# Patient Record
Sex: FEMALE | Race: OTHER | NOT HISPANIC OR LATINO
[De-identification: names, ages, dates, MRNs, and addresses within clinical notes are randomized per-mention and may not be internally consistent; named-entity substitution may affect disease eponyms.]

---

## 2019-08-07 PROBLEM — Z00.00 ENCOUNTER FOR PREVENTIVE HEALTH EXAMINATION: Status: ACTIVE | Noted: 2019-08-07

## 2019-08-08 ENCOUNTER — APPOINTMENT (OUTPATIENT)
Dept: SURGERY | Facility: CLINIC | Age: 44
End: 2019-08-08
Payer: COMMERCIAL

## 2019-08-08 VITALS
OXYGEN SATURATION: 99 % | TEMPERATURE: 98.9 F | BODY MASS INDEX: 21.6 KG/M2 | WEIGHT: 110 LBS | DIASTOLIC BLOOD PRESSURE: 85 MMHG | HEART RATE: 71 BPM | SYSTOLIC BLOOD PRESSURE: 129 MMHG | HEIGHT: 60 IN

## 2019-08-08 PROCEDURE — 99204 OFFICE O/P NEW MOD 45 MIN: CPT

## 2019-08-15 ENCOUNTER — RESULT REVIEW (OUTPATIENT)
Age: 44
End: 2019-08-15

## 2019-08-19 ENCOUNTER — APPOINTMENT (OUTPATIENT)
Dept: SURGERY | Facility: CLINIC | Age: 44
End: 2019-08-19
Payer: COMMERCIAL

## 2019-08-19 VITALS
BODY MASS INDEX: 21.6 KG/M2 | RESPIRATION RATE: 99 BRPM | HEART RATE: 98 BPM | SYSTOLIC BLOOD PRESSURE: 120 MMHG | TEMPERATURE: 98.1 F | DIASTOLIC BLOOD PRESSURE: 83 MMHG | HEIGHT: 60 IN | WEIGHT: 110 LBS

## 2019-08-19 PROCEDURE — 99213 OFFICE O/P EST LOW 20 MIN: CPT

## 2019-09-09 ENCOUNTER — APPOINTMENT (OUTPATIENT)
Dept: SURGERY | Facility: CLINIC | Age: 44
End: 2019-09-09
Payer: COMMERCIAL

## 2019-09-09 VITALS
SYSTOLIC BLOOD PRESSURE: 138 MMHG | HEIGHT: 60 IN | BODY MASS INDEX: 21.6 KG/M2 | HEART RATE: 103 BPM | WEIGHT: 110 LBS | OXYGEN SATURATION: 100 % | DIASTOLIC BLOOD PRESSURE: 85 MMHG

## 2019-09-09 DIAGNOSIS — Z78.9 OTHER SPECIFIED HEALTH STATUS: ICD-10-CM

## 2019-09-09 DIAGNOSIS — Z82.49 FAMILY HISTORY OF ISCHEMIC HEART DISEASE AND OTHER DISEASES OF THE CIRCULATORY SYSTEM: ICD-10-CM

## 2019-09-09 DIAGNOSIS — Z80.3 FAMILY HISTORY OF MALIGNANT NEOPLASM OF BREAST: ICD-10-CM

## 2019-09-09 PROCEDURE — 99213 OFFICE O/P EST LOW 20 MIN: CPT

## 2019-09-09 NOTE — HISTORY OF PRESENT ILLNESS
[de-identified] : This is a 44 years old patient s/p Sonoguided left breast biopsy on 08/15/2019. pathology results are consistent with Invasive poorly differentiated ductal carcinoma. ER/DE positive HER2 negative.   Patient is s/p BL breast MRI on 08/23/2019 non mass enhancement  that showed  in the right breast. MRI guided breast biopsy was recommended.

## 2019-09-09 NOTE — PLAN
[FreeTextEntry1] : right breast MRI guided Biopsy \par return after the test\par obtain results of genetic testing

## 2019-09-09 NOTE — DATA REVIEWED
[FreeTextEntry1] :  Vivienne Accession Number : [id number]\par \par ALBERTO STANTON                   1\par \par \par \par Surgical Final Report\par \par \par \par \par Final Diagnosis\par Breast, left, 3 o?clock, ultrasound guided core biopsy\par - Invasive poorly differentiated ductal carcinoma\par - Ines score 8/9 (3 + 3 + 2) in this limited material\par - Invasive tumor measures at least 1.4 cm\par - Ductal carcinoma in situ, solid pattern, high-nuclear\par grade\par - Microcalcifications are not seen\par - Lymphovascular permeation by tumor is not seen\par - ER/AR/HER-2 study in progress; an addendum will follow\par \par Case reported to Tumor Registry.\par \par Dr. Burnette was notified of the diagnosis by email on 8/19/2019.\par \par Verified by: Nomi Roche DO\par (Electronic Signature)\par Reported on: 08/19/19 10:45 EDT, [address] [place] [address]\par [id number]\par _________________________________________________________________\par \par Clinical Information\par N63.0\par Sonoguided biopsy for nodule\par

## 2019-09-09 NOTE — ASSESSMENT
[FreeTextEntry1] :  Patient was told significance of findings, options, risks and benefits were explained.   patient was advised to have right breast MRI guided biopsy.   Patient's questions and concerns addressed to patient's satisfaction, and patient verbalized an understanding of the information discussed.\par \par

## 2019-09-09 NOTE — PHYSICAL EXAM
[Calm] : calm [de-identified] :   anicteric.  Nasal mucosa pink, septum midline. Oral mucosa pink.  Tongue midline, Pharynx without exudates.\par   [de-identified] : The patient is alert, well-groomed, and cheerful.\par   [de-identified] :  Neck supple. Trachea midline. Thyroid isthmus barely palpable, lobes not felt.\par   [de-identified] : No chest deformity, asymmetry. Normal contours. No nodules, masses, tenderness, or axillary adenopathy. No nipple discharge.\par

## 2019-09-23 ENCOUNTER — APPOINTMENT (OUTPATIENT)
Dept: SURGERY | Facility: CLINIC | Age: 44
End: 2019-09-23

## 2019-09-23 VITALS
HEART RATE: 93 BPM | BODY MASS INDEX: 21.6 KG/M2 | OXYGEN SATURATION: 100 % | TEMPERATURE: 98.7 F | WEIGHT: 110 LBS | DIASTOLIC BLOOD PRESSURE: 84 MMHG | SYSTOLIC BLOOD PRESSURE: 124 MMHG | HEIGHT: 60 IN

## 2019-09-26 ENCOUNTER — APPOINTMENT (OUTPATIENT)
Dept: SURGERY | Facility: CLINIC | Age: 44
End: 2019-09-26
Payer: COMMERCIAL

## 2019-09-26 VITALS
OXYGEN SATURATION: 100 % | TEMPERATURE: 98.4 F | BODY MASS INDEX: 21.6 KG/M2 | SYSTOLIC BLOOD PRESSURE: 126 MMHG | HEIGHT: 60 IN | HEART RATE: 99 BPM | DIASTOLIC BLOOD PRESSURE: 78 MMHG | WEIGHT: 110 LBS

## 2019-09-26 PROCEDURE — 99213 OFFICE O/P EST LOW 20 MIN: CPT

## 2019-09-26 NOTE — ASSESSMENT
[FreeTextEntry1] : Impression: Breast cancer \par patient wants to have mastectomy with reconstruction

## 2019-09-26 NOTE — PLAN
[FreeTextEntry1] : Patient was told significance of findings, options, risks and benefits were explained.  Informed consent for left modified radical mastectomy  and right simple mastectomy with sentinel node biopsy and potential risks, benefits and alternatives (surgical options were discussed including non-surgical options or the option of no surgery) to the planned surgery were discussed in depth.  All surgical options were discussed including non-surgical treatments. Patient wishes to proceed with surgery.  We will plan for surgery on at the next available date, pending any required insurance pre-certification or pre-approval. Patient agrees to obtain any necessary pre-operative evaluations and testing prior to surgery.\par Patient advised to seek immediate medical attention with any acute change in symptoms or with the development of any new or worsening symptoms.  Patient's questions and concerns addressed to patient's satisfaction, and patient verbalized an understanding of the information discussed.\par \par patient needs to consult plastics -Dr Curtis. contact info provided \par \par  \par \par

## 2019-09-26 NOTE — PHYSICAL EXAM
[de-identified] : The patient is alert, well-groomed, and cheerful.\par   [de-identified] :   anicteric.  Nasal mucosa pink, septum midline. Oral mucosa pink.  Tongue midline, Pharynx without exudates.\par   [Calm] : calm [de-identified] :  Neck supple. Trachea midline. Thyroid isthmus barely palpable, lobes not felt.\par   [de-identified] : No chest deformity, asymmetry. Normal contours. No nodules, masses, tenderness, or axillary adenopathy. No nipple discharge.\par

## 2019-09-26 NOTE — DATA REVIEWED
[FreeTextEntry1] : Exam requested by:\par DIAMOND MARSH MD\par [id number] [facility], GROUND FLOOR\par [facility] [address]\par SITE PERFORMED: [facility]\par Patient: ALBERTO STANTON\par YOB: 1975\par Phone: [phone]\par MRN: [id number] Acc: [id number]\par Date of Exam: 09-\par  \par Addendum 1 - 09-\par  \par MR guided core biopsy 2 sites in the right breast.\par \par Right breast 9 o'clock axis MR guided core biopsy, DCIS, intermediate to high nuclear grade, solid and cribriform with papillary type, necrosis and calcifications.\par Malignant and concordant.\par \par \par Right upper inner breast core biopsy fibrocystic, fibroadenomatoid change with calcifications.\par Benign and concordant.\par \par \par FOLLOW-UP - surgical consultation \par \par Patient with known left breast malignancy, 3 o'clock axis, new diagnosis right breast DCIS 9 o'clock axis, cylinder-shaped tissue marker clip.\par \par ASSESSMENT - BI-RADS 6\par Results were discussed with the nurse practitioner in Dr. Alegria's office, [place], and read back verification was obtained.\par \par Message was left on patient's provided telephone number informing her that biopsy results are available.\par \par \par \par Thank you for the opportunity to participate in the care of this patient.  \par  \par Nerissa Amaya MD  - Electronically Signed: 09- 12:25 PM \par Physician to Physician Direct Line is: [phone]\par Original Report

## 2019-09-26 NOTE — HISTORY OF PRESENT ILLNESS
[de-identified] :  patient presenting today  to discuss the results of her recent  breast imaging.  patient s/p   MR guided core biopsy 2 sites in the right breast  on 09/16/2019.  pathology results are consistent with Right breast 9 o'clock axis  DCIS. Right upper inner breast core biopsy fibrocystic, fibroadenomatoid change with calcifications. concordant. Today patient offers no complaints. patient reports no fever, chills,  or  pain.    Patient denies any   nipple discharge. Patient denies any fever, night sweats or loss of appetite.\par

## 2019-09-29 PROBLEM — Z00.00 ENCOUNTER FOR PREVENTIVE HEALTH EXAMINATION: Noted: 2019-09-29

## 2019-10-24 ENCOUNTER — APPOINTMENT (OUTPATIENT)
Dept: PLASTIC SURGERY | Facility: CLINIC | Age: 44
End: 2019-10-24
Payer: COMMERCIAL

## 2019-10-24 VITALS
HEART RATE: 91 BPM | SYSTOLIC BLOOD PRESSURE: 130 MMHG | BODY MASS INDEX: 21.6 KG/M2 | HEIGHT: 60 IN | TEMPERATURE: 97.9 F | WEIGHT: 110 LBS | DIASTOLIC BLOOD PRESSURE: 82 MMHG

## 2019-10-24 DIAGNOSIS — Z87.09 PERSONAL HISTORY OF OTHER DISEASES OF THE RESPIRATORY SYSTEM: ICD-10-CM

## 2019-10-24 DIAGNOSIS — Z80.3 FAMILY HISTORY OF MALIGNANT NEOPLASM OF BREAST: ICD-10-CM

## 2019-10-24 DIAGNOSIS — Z82.49 FAMILY HISTORY OF ISCHEMIC HEART DISEASE AND OTHER DISEASES OF THE CIRCULATORY SYSTEM: ICD-10-CM

## 2019-10-24 PROCEDURE — 99204 OFFICE O/P NEW MOD 45 MIN: CPT

## 2019-10-27 PROBLEM — Z87.09 HISTORY OF ASTHMA: Status: RESOLVED | Noted: 2019-10-24 | Resolved: 2019-10-27

## 2019-10-27 PROBLEM — Z82.49 FAMILY HISTORY OF ESSENTIAL HYPERTENSION: Status: ACTIVE | Noted: 2019-10-24

## 2019-10-27 PROBLEM — Z80.3 FAMILY HISTORY OF BREAST CANCER: Status: ACTIVE | Noted: 2019-10-24

## 2019-10-27 RX ORDER — ASCORBIC ACID 500 MG
TABLET ORAL
Refills: 0 | Status: ACTIVE | COMMUNITY

## 2019-10-27 RX ORDER — CHROMIUM 200 MCG
TABLET ORAL
Refills: 0 | Status: ACTIVE | COMMUNITY

## 2019-10-27 NOTE — CONSULT LETTER
[Dear  ___] : Dear  [unfilled], [Consult Letter:] : I had the pleasure of evaluating your patient, [unfilled]. [Please see my note below.] : Please see my note below. [Consult Closing:] : Thank you very much for allowing me to participate in the care of this patient.  If you have any questions, please do not hesitate to contact me. [Sincerely,] : Sincerely, [FreeTextEntry3] : Jian Curtis MD

## 2019-10-27 NOTE — REASON FOR VISIT
[Initial Evaluation] : an initial evaluation [Spouse] : spouse [FreeTextEntry1] : Dr. Mckinley Hu (Surgery)

## 2019-11-01 ENCOUNTER — FORM ENCOUNTER (OUTPATIENT)
Age: 44
End: 2019-11-01

## 2019-11-02 ENCOUNTER — APPOINTMENT (OUTPATIENT)
Dept: CT IMAGING | Facility: IMAGING CENTER | Age: 44
End: 2019-11-02
Payer: COMMERCIAL

## 2019-11-02 ENCOUNTER — OUTPATIENT (OUTPATIENT)
Dept: OUTPATIENT SERVICES | Facility: HOSPITAL | Age: 44
LOS: 1 days | End: 2019-11-02
Payer: COMMERCIAL

## 2019-11-02 DIAGNOSIS — C50.919 MALIGNANT NEOPLASM OF UNSPECIFIED SITE OF UNSPECIFIED FEMALE BREAST: ICD-10-CM

## 2019-11-02 PROCEDURE — 74174 CTA ABD&PLVS W/CONTRAST: CPT | Mod: 26

## 2019-11-02 PROCEDURE — 74174 CTA ABD&PLVS W/CONTRAST: CPT

## 2019-11-08 ENCOUNTER — OUTPATIENT (OUTPATIENT)
Dept: OUTPATIENT SERVICES | Facility: HOSPITAL | Age: 44
LOS: 1 days | End: 2019-11-08
Payer: COMMERCIAL

## 2019-11-08 VITALS
OXYGEN SATURATION: 98 % | WEIGHT: 110.01 LBS | DIASTOLIC BLOOD PRESSURE: 73 MMHG | TEMPERATURE: 98 F | RESPIRATION RATE: 16 BRPM | HEART RATE: 88 BPM | HEIGHT: 60 IN | SYSTOLIC BLOOD PRESSURE: 111 MMHG

## 2019-11-08 DIAGNOSIS — C50.919 MALIGNANT NEOPLASM OF UNSPECIFIED SITE OF UNSPECIFIED FEMALE BREAST: ICD-10-CM

## 2019-11-08 DIAGNOSIS — Z98.890 OTHER SPECIFIED POSTPROCEDURAL STATES: Chronic | ICD-10-CM

## 2019-11-08 DIAGNOSIS — Z01.818 ENCOUNTER FOR OTHER PREPROCEDURAL EXAMINATION: ICD-10-CM

## 2019-11-08 DIAGNOSIS — Z98.891 HISTORY OF UTERINE SCAR FROM PREVIOUS SURGERY: Chronic | ICD-10-CM

## 2019-11-08 LAB — BLD GP AB SCN SERPL QL: SIGNIFICANT CHANGE UP

## 2019-11-08 PROCEDURE — G0463: CPT

## 2019-11-08 RX ORDER — SODIUM CHLORIDE 9 MG/ML
3 INJECTION INTRAMUSCULAR; INTRAVENOUS; SUBCUTANEOUS EVERY 8 HOURS
Refills: 0 | Status: DISCONTINUED | OUTPATIENT
Start: 2019-11-15 | End: 2019-11-18

## 2019-11-08 NOTE — H&P PST ADULT - ASSESSMENT
45 yo female is scheduled for : Left Modified Mastectomy and Right Simple Mastectomy with [place] Lymph node Biopsy  Bilateral Breast Reconstruction with Dep Flap Possible Mesh, on 11/15/19

## 2019-11-08 NOTE — H&P PST ADULT - NSICDXPASTSURGICALHX_GEN_ALL_CORE_FT
PAST SURGICAL HISTORY:  H/O  section x 1    S/P bilateral breast biopsy     S/P dilation and curettage

## 2019-11-08 NOTE — H&P PST ADULT - NSICDXPROBLEM_GEN_ALL_CORE_FT
PROBLEM DIAGNOSES  Problem: Malignant neoplasm of unspecified site of unspecified female breast  Assessment and Plan: Left Modified Mastectomy and Right Simple Mastectomy with [place] Lymph node Biopsy  Bilateral Breast Reconstruction with Dep Flap Possible Mesh

## 2019-11-08 NOTE — H&P PST ADULT - HISTORY OF PRESENT ILLNESS
43 yo female with no significant PMHx reports the above. Patient denies pain, discharge in her breast.  She is scheduled for Left Modified Mastectomy and Right Simple Mastectomy with [place] Lymph node Biopsy  Bilateral Breast Reconstruction with Dep Flap Possible Mesh, on 11/15/19

## 2019-11-08 NOTE — H&P PST ADULT - NSICDXFAMILYHX_GEN_ALL_CORE_FT
FAMILY HISTORY:  FH: breast cancer, Aunt and 1st cousin  FH: heart disease, In  father  FHx: kidney disease, and Heart disease in  mother

## 2019-11-14 ENCOUNTER — TRANSCRIPTION ENCOUNTER (OUTPATIENT)
Age: 44
End: 2019-11-14

## 2019-11-15 ENCOUNTER — INPATIENT (INPATIENT)
Facility: HOSPITAL | Age: 44
LOS: 2 days | Discharge: HOME | DRG: 579 | End: 2019-11-18
Attending: PLASTIC SURGERY | Admitting: PLASTIC SURGERY
Payer: COMMERCIAL

## 2019-11-15 ENCOUNTER — APPOINTMENT (OUTPATIENT)
Dept: SURGERY | Facility: HOSPITAL | Age: 44
End: 2019-11-15
Payer: COMMERCIAL

## 2019-11-15 ENCOUNTER — RESULT REVIEW (OUTPATIENT)
Age: 44
End: 2019-11-15

## 2019-11-15 VITALS
HEART RATE: 103 BPM | RESPIRATION RATE: 16 BRPM | WEIGHT: 104.94 LBS | OXYGEN SATURATION: 100 % | DIASTOLIC BLOOD PRESSURE: 77 MMHG | SYSTOLIC BLOOD PRESSURE: 125 MMHG | TEMPERATURE: 97 F | HEIGHT: 58 IN

## 2019-11-15 DIAGNOSIS — Z98.890 OTHER SPECIFIED POSTPROCEDURAL STATES: Chronic | ICD-10-CM

## 2019-11-15 DIAGNOSIS — Z98.891 HISTORY OF UTERINE SCAR FROM PREVIOUS SURGERY: Chronic | ICD-10-CM

## 2019-11-15 DIAGNOSIS — C50.919 MALIGNANT NEOPLASM OF UNSPECIFIED SITE OF UNSPECIFIED FEMALE BREAST: ICD-10-CM

## 2019-11-15 LAB
ALBUMIN SERPL ELPH-MCNC: 2.5 G/DL — LOW (ref 3.5–5)
ALP SERPL-CCNC: 52 U/L — SIGNIFICANT CHANGE UP (ref 40–120)
ALT FLD-CCNC: 18 U/L DA — SIGNIFICANT CHANGE UP (ref 10–60)
ANION GAP SERPL CALC-SCNC: 9 MMOL/L — SIGNIFICANT CHANGE UP (ref 5–17)
APTT BLD: 26.3 SEC — LOW (ref 27.5–36.3)
AST SERPL-CCNC: 35 U/L — SIGNIFICANT CHANGE UP (ref 10–40)
BASOPHILS # BLD AUTO: 0 K/UL — SIGNIFICANT CHANGE UP (ref 0–0.2)
BASOPHILS NFR BLD AUTO: 0 % — SIGNIFICANT CHANGE UP (ref 0–2)
BILIRUB SERPL-MCNC: 0.6 MG/DL — SIGNIFICANT CHANGE UP (ref 0.2–1.2)
BLD GP AB SCN SERPL QL: SIGNIFICANT CHANGE UP
BUN SERPL-MCNC: 7 MG/DL — SIGNIFICANT CHANGE UP (ref 7–18)
CALCIUM SERPL-MCNC: 7.4 MG/DL — LOW (ref 8.4–10.5)
CHLORIDE SERPL-SCNC: 106 MMOL/L — SIGNIFICANT CHANGE UP (ref 96–108)
CO2 SERPL-SCNC: 24 MMOL/L — SIGNIFICANT CHANGE UP (ref 22–31)
CREAT SERPL-MCNC: 0.64 MG/DL — SIGNIFICANT CHANGE UP (ref 0.5–1.3)
EOSINOPHIL # BLD AUTO: 0 K/UL — SIGNIFICANT CHANGE UP (ref 0–0.5)
EOSINOPHIL NFR BLD AUTO: 0 % — SIGNIFICANT CHANGE UP (ref 0–6)
GLUCOSE SERPL-MCNC: 156 MG/DL — HIGH (ref 70–99)
HCG UR QL: NEGATIVE — SIGNIFICANT CHANGE UP
HCT VFR BLD CALC: 31.8 % — LOW (ref 34.5–45)
HGB BLD-MCNC: 9.8 G/DL — LOW (ref 11.5–15.5)
INR BLD: 1.1 RATIO — SIGNIFICANT CHANGE UP (ref 0.88–1.16)
LACTATE SERPL-SCNC: 2.7 MMOL/L — HIGH (ref 0.7–2)
LACTATE SERPL-SCNC: 3.3 MMOL/L — HIGH (ref 0.7–2)
LYMPHOCYTES # BLD AUTO: 11 % — LOW (ref 13–44)
LYMPHOCYTES # BLD AUTO: 2.2 K/UL — SIGNIFICANT CHANGE UP (ref 1–3.3)
MAGNESIUM SERPL-MCNC: 1.3 MG/DL — LOW (ref 1.6–2.6)
MCHC RBC-ENTMCNC: 20.2 PG — LOW (ref 27–34)
MCHC RBC-ENTMCNC: 30.8 GM/DL — LOW (ref 32–36)
MCV RBC AUTO: 65.7 FL — LOW (ref 80–100)
MONOCYTES # BLD AUTO: 1 K/UL — HIGH (ref 0–0.9)
MONOCYTES NFR BLD AUTO: 5 % — SIGNIFICANT CHANGE UP (ref 2–14)
NEUTROPHILS # BLD AUTO: 16.42 K/UL — HIGH (ref 1.8–7.4)
NEUTROPHILS NFR BLD AUTO: 76 % — SIGNIFICANT CHANGE UP (ref 43–77)
PHOSPHATE SERPL-MCNC: 3.6 MG/DL — SIGNIFICANT CHANGE UP (ref 2.5–4.5)
PLATELET # BLD AUTO: 359 K/UL — SIGNIFICANT CHANGE UP (ref 150–400)
POTASSIUM SERPL-MCNC: 3.6 MMOL/L — SIGNIFICANT CHANGE UP (ref 3.5–5.3)
POTASSIUM SERPL-SCNC: 3.6 MMOL/L — SIGNIFICANT CHANGE UP (ref 3.5–5.3)
PROT SERPL-MCNC: 5.6 G/DL — LOW (ref 6–8.3)
PROTHROM AB SERPL-ACNC: 12.3 SEC — SIGNIFICANT CHANGE UP (ref 10–12.9)
RBC # BLD: 4.84 M/UL — SIGNIFICANT CHANGE UP (ref 3.8–5.2)
RBC # FLD: 14.3 % — SIGNIFICANT CHANGE UP (ref 10.3–14.5)
SODIUM SERPL-SCNC: 139 MMOL/L — SIGNIFICANT CHANGE UP (ref 135–145)
WBC # BLD: 20.03 K/UL — HIGH (ref 3.8–10.5)
WBC # FLD AUTO: 20.03 K/UL — HIGH (ref 3.8–10.5)

## 2019-11-15 PROCEDURE — 49999 UNLISTED PX ABD PERTM&OMN: CPT | Mod: 62

## 2019-11-15 PROCEDURE — 38530 BIOPSY/REMOVAL LYMPH NODES: CPT | Mod: RT

## 2019-11-15 PROCEDURE — 88307 TISSUE EXAM BY PATHOLOGIST: CPT | Mod: 26

## 2019-11-15 PROCEDURE — 99291 CRITICAL CARE FIRST HOUR: CPT

## 2019-11-15 PROCEDURE — 38900 IO MAP OF SENT LYMPH NODE: CPT | Mod: AS

## 2019-11-15 PROCEDURE — 78195 LYMPH SYSTEM IMAGING: CPT | Mod: 26

## 2019-11-15 PROCEDURE — 88341 IMHCHEM/IMCYTCHM EA ADD ANTB: CPT | Mod: 26

## 2019-11-15 PROCEDURE — 88305 TISSUE EXAM BY PATHOLOGIST: CPT | Mod: 26

## 2019-11-15 PROCEDURE — 19307 MAST MOD RAD: CPT | Mod: AS,LT

## 2019-11-15 PROCEDURE — S2068: CPT | Mod: RT

## 2019-11-15 PROCEDURE — 88342 IMHCHEM/IMCYTCHM 1ST ANTB: CPT | Mod: 26

## 2019-11-15 PROCEDURE — S2068: CPT | Mod: LT

## 2019-11-15 PROCEDURE — 38530 BIOPSY/REMOVAL LYMPH NODES: CPT | Mod: LT

## 2019-11-15 PROCEDURE — 19307 MAST MOD RAD: CPT | Mod: LT

## 2019-11-15 PROCEDURE — 88309 TISSUE EXAM BY PATHOLOGIST: CPT | Mod: 26

## 2019-11-15 PROCEDURE — 19303 MAST SIMPLE COMPLETE: CPT | Mod: AS,RT

## 2019-11-15 PROCEDURE — 88331 PATH CONSLTJ SURG 1 BLK 1SPC: CPT | Mod: 26

## 2019-11-15 PROCEDURE — 19303 MAST SIMPLE COMPLETE: CPT | Mod: RT

## 2019-11-15 PROCEDURE — 88334 PATH CONSLTJ SURG CYTO XM EA: CPT | Mod: 26,59

## 2019-11-15 PROCEDURE — 38900 IO MAP OF SENT LYMPH NODE: CPT

## 2019-11-15 RX ORDER — MAGNESIUM SULFATE 500 MG/ML
1 VIAL (ML) INJECTION ONCE
Refills: 0 | Status: COMPLETED | OUTPATIENT
Start: 2019-11-15 | End: 2019-11-15

## 2019-11-15 RX ORDER — ACETAMINOPHEN 500 MG
1000 TABLET ORAL ONCE
Refills: 0 | Status: COMPLETED | OUTPATIENT
Start: 2019-11-15 | End: 2019-11-16

## 2019-11-15 RX ORDER — HYDROMORPHONE HYDROCHLORIDE 2 MG/ML
1 INJECTION INTRAMUSCULAR; INTRAVENOUS; SUBCUTANEOUS EVERY 4 HOURS
Refills: 0 | Status: DISCONTINUED | OUTPATIENT
Start: 2019-11-15 | End: 2019-11-17

## 2019-11-15 RX ORDER — CEFAZOLIN SODIUM 1 G
2000 VIAL (EA) INJECTION EVERY 8 HOURS
Refills: 0 | Status: COMPLETED | OUTPATIENT
Start: 2019-11-15 | End: 2019-11-16

## 2019-11-15 RX ORDER — ONDANSETRON 8 MG/1
4 TABLET, FILM COATED ORAL ONCE
Refills: 0 | Status: COMPLETED | OUTPATIENT
Start: 2019-11-15 | End: 2019-11-15

## 2019-11-15 RX ORDER — PANTOPRAZOLE SODIUM 20 MG/1
40 TABLET, DELAYED RELEASE ORAL DAILY
Refills: 0 | Status: DISCONTINUED | OUTPATIENT
Start: 2019-11-15 | End: 2019-11-18

## 2019-11-15 RX ORDER — ENOXAPARIN SODIUM 100 MG/ML
40 INJECTION SUBCUTANEOUS DAILY
Refills: 0 | Status: DISCONTINUED | OUTPATIENT
Start: 2019-11-15 | End: 2019-11-18

## 2019-11-15 RX ORDER — ONDANSETRON 8 MG/1
4 TABLET, FILM COATED ORAL ONCE
Refills: 0 | Status: COMPLETED | OUTPATIENT
Start: 2019-11-15 | End: 2019-11-16

## 2019-11-15 RX ORDER — CHLORHEXIDINE GLUCONATE 213 G/1000ML
1 SOLUTION TOPICAL DAILY
Refills: 0 | Status: DISCONTINUED | OUTPATIENT
Start: 2019-11-15 | End: 2019-11-18

## 2019-11-15 RX ORDER — SODIUM CHLORIDE 9 MG/ML
1000 INJECTION, SOLUTION INTRAVENOUS
Refills: 0 | Status: DISCONTINUED | OUTPATIENT
Start: 2019-11-15 | End: 2019-11-17

## 2019-11-15 RX ORDER — SODIUM CHLORIDE 9 MG/ML
1000 INJECTION, SOLUTION INTRAVENOUS ONCE
Refills: 0 | Status: COMPLETED | OUTPATIENT
Start: 2019-11-15 | End: 2019-11-15

## 2019-11-15 RX ADMIN — Medication 100 GRAM(S): at 20:18

## 2019-11-15 RX ADMIN — HYDROMORPHONE HYDROCHLORIDE 1 MILLIGRAM(S): 2 INJECTION INTRAMUSCULAR; INTRAVENOUS; SUBCUTANEOUS at 19:15

## 2019-11-15 RX ADMIN — ONDANSETRON 4 MILLIGRAM(S): 8 TABLET, FILM COATED ORAL at 18:53

## 2019-11-15 RX ADMIN — SODIUM CHLORIDE 3 MILLILITER(S): 9 INJECTION INTRAMUSCULAR; INTRAVENOUS; SUBCUTANEOUS at 06:45

## 2019-11-15 RX ADMIN — SODIUM CHLORIDE 3 MILLILITER(S): 9 INJECTION INTRAMUSCULAR; INTRAVENOUS; SUBCUTANEOUS at 21:32

## 2019-11-15 RX ADMIN — HYDROMORPHONE HYDROCHLORIDE 1 MILLIGRAM(S): 2 INJECTION INTRAMUSCULAR; INTRAVENOUS; SUBCUTANEOUS at 18:53

## 2019-11-15 RX ADMIN — SODIUM CHLORIDE 125 MILLILITER(S): 9 INJECTION, SOLUTION INTRAVENOUS at 18:53

## 2019-11-15 RX ADMIN — Medication 100 MILLIGRAM(S): at 21:34

## 2019-11-15 NOTE — CONSULT NOTE ADULT - ASSESSMENT
Patient is a 45yo female with no significant PMH, PSH of  and D&C, presented to surgery for breast surgery. Patient reports finding a left sided breast mass in 2019, underwent mammography and biopsy in 2019 and was diagnosed with breast cancer. Patient underwent right sided simple mastectomy, left modified mastectomy with sentinel lymph node biopsy and breast reconstruction with ROD (abdominal based flap) with abdominal mesh placement. She had an estimated blood loss of 200cc, was given 6l fluids intra-op with urine output of 6l. Patient reports feeling nauseous and slightly sleepy from residual effect of anesthesia, otherwise denies pain or any other symptoms.  Discussed with surgeon Dr Curtis, recommends to keep patient npo and monitor oxygen saturation: currently on 52% on right side and 49% on left side. He also recommends to hold off from starting pressors in case blood pressure drops and call him. Ok to resume dvt prophylaxis. Patient is a 45yo female with no significant PMH, PSH of  and D&C, presented to surgery for breast surgery. Patient reports finding a left sided breast mass in 2019, underwent mammography and biopsy in 2019 and was diagnosed with breast cancer.     Patient underwent right sided simple mastectomy, left modified mastectomy with sentinel lymph node biopsy and breast reconstruction with ROD (abdominal based flap) with abdominal mesh placement. She had an estimated blood loss of 200cc, was given 6l fluids intra-op with urine output of 6l. Patient reports feeling nauseous and slightly sleepy from residual effect of anesthesia, otherwise denies pain or any other symptoms.    Discussed with surgeon, Dr Curtis, post op who recommends to keep patient npo, and monitor Bioptics saturation: currently on 52% on right side and 49% on left side.     Pt admitted to the ICU for Post op/ frequent bioptics monitoring     Assessment:  -Bilateral Mastectomy Breast reconstruction w/ ROD  -Monitor Bioptics saturation- if a drop 10% or greater contact Dr. Curtis and Surgical house officer     Plan:  Neuro:  -Extubated safely- currently at baseline mental status  -Pain control with Dilaudid IV     CV:  -Post op hypovolemia- s/p 6L NS intra op  -c/w LR @ 125ml/hr  -monitor urine output with hess catheter   -Tele monitoring for now     Pulm:  -Currently pt saturating 99% on room air   -No pulmonary concern at this time       ID:  -Post of Lactate of 2.8  -will continue maintenance hydration at this time and follow up lactate     Nephro:  -c/w IV hydration and monitor UOP  -f/u BMP daily     GI:  -NPO for now- will adv diet per surgery recs    Heme:  -no indication for transfusion     Endo:  -target CBG < 180    Prophy:  -Lovenox for DVT prophy     Dispo:  - monitor in the ICU at this time.  If pt remains stable- will likely be downgraded to surgical service tomorrow     [facility]   FULL CODE

## 2019-11-15 NOTE — CONSULT NOTE ADULT - SUBJECTIVE AND OBJECTIVE BOX
Patient is a 43yo female with no significant PMH, PSH of  and D&C, presented to surgery for breast surgery. Patient reports finding a left sided breast mass in 2019, underwent mammography and biopsy in 2019 and was diagnosed with breast cancer. Patient underwent right sided simple mastectomy, left modified mastectomy with sentinel lymph node biopsy and breast reconstruction with ROD (abdominal based flap) with abdominal mesh placement. She had an estimated blood loss of 200cc, was given 6l fluids intra-op with urine output of 6l. Patient reports feeling nauseous and slightly sleepy from residual effect of anesthesia, otherwise denies pain or any other symptoms.  Discussed with surgeon Dr Curtis, recommends to keep patient npo and monitor oxygen saturation: currently on 52% on right side and 49% on left side. He also recommends to hold off from starting pressors in case blood pressure drops and call him. Ok to resume dvt prophylaxis. Patient is a 44y old  Female who presents with a chief complaint of breast cancer surgery (15 Nov 2019 18:40)    Patient is a 45yo female with no significant PMH, PSH of  and D&C, presented to surgery for breast surgery. Patient reports finding a left sided breast mass in 2019, underwent mammography and biopsy in 2019 and was diagnosed with breast cancer. Patient underwent right sided simple mastectomy, left modified mastectomy with sentinel lymph node biopsy and breast reconstruction with ROD (abdominal based flap) with abdominal mesh placement. She had an estimated blood loss of 200cc, was given 6l fluids intra-op with urine output of 6l. Patient reports feeling nauseous and slightly sleepy from residual effect of anesthesia, otherwise denies pain or any other symptoms.  Discussed with surgeon Dr Curtis, recommends to keep patient npo and monitor oxygen saturation: currently on 52% on right side and 49% on left side. He also recommends to hold off from starting pressors in case blood pressure drops and call him. Ok to resume dvt prophylaxis.       PAST MEDICAL & SURGICAL HISTORY:  Malignant neoplasm of unspecified site of unspecified female breast  S/P dilation and curettage  S/P bilateral breast biopsy  H/O  section: x 1    Allergies    Advil (Short breath)  Excedrin (Short breath)    Intolerances      FAMILY HISTORY:  FH: heart disease: In  father  FHx: kidney disease: and Heart disease in  mother  FH: breast cancer: Aunt and 1st cousin    Social history reviewed: denies smoking, alcohol or illicit drug use    Review of Systems:  CONSTITUTIONAL: No fever, chills, or fatigue  EYES: No eye pain, visual disturbances, or discharge  ENMT:  No difficulty hearing, tinnitus, vertigo; No sinus or throat pain  NECK: No pain or stiffness  RESPIRATORY: No cough, wheezing, chills or hemoptysis; No shortness of breath  CARDIOVASCULAR: No chest pain, palpitations, dizziness, or leg swelling  GASTROINTESTINAL: No abdominal or epigastric pain. nausea, no vomiting, or hematemesis; No diarrhea or constipation. No melena or hematochezia.  GENITOURINARY: No dysuria, frequency, hematuria, or incontinence  NEUROLOGICAL: No headaches, memory loss, loss of strength, numbness, or tremors  SKIN: No itching, burning, rashes, or lesions   MUSCULOSKELETAL: No joint pain or swelling; No muscle, back, or extremity pain  PSYCHIATRIC: No depression, anxiety, mood swings, or difficulty sleeping      Medications:  acetaminophen  IVPB .. 1000 milliGRAM(s) IV Intermittent once PRN  ceFAZolin   IVPB 2000 milliGRAM(s) IV Intermittent every 8 hours  enoxaparin Injectable 40 milliGRAM(s) SubCutaneous daily  HYDROmorphone  Injectable 1 milliGRAM(s) IV Push every 4 hours PRN  lactated ringers. 1000 milliLiter(s) IV Continuous <Continuous>  magnesium sulfate  IVPB 1 Gram(s) IV Intermittent once  ondansetron Injectable 4 milliGRAM(s) IV Push once PRN  pantoprazole  Injectable 40 milliGRAM(s) IV Push daily  sodium chloride 0.9% lock flush 3 milliLiter(s) IV Push every 8 hours      Vital Signs Last 24 Hrs  T(C): 36.3 (15 Nov 2019 06:45), Max: 36.3 (15 Nov 2019 06:45)  T(F): 97.4 (15 Nov 2019 06:45), Max: 97.4 (15 Nov 2019 06:45)  HR: 105 (15 Nov 2019 19:10) (96 - 110)  BP: 107/56 (15 Nov 2019 19:10) (92/66 - 125/77)  BP(mean): 68 (15 Nov 2019 19:10) (68 - 79)  RR: 17 (15 Nov 2019 19:10) (13 - 24)  SpO2: 100% (15 Nov 2019 19:10) (100% - 100%)              LABS:                        9.8    20.03 )-----------( 359      ( 15 Nov 2019 18:28 )             31.8     11-15    139  |  106  |  7   ----------------------------<  156<H>  3.6   |  24  |  0.64    Ca    7.4<L>      15 Nov 2019 18:28  Phos  3.6     11-15  Mg     1.3     -15    TPro  5.6<L>  /  Alb  2.5<L>  /  TBili  0.6  /  DBili  x   /  AST  35  /  ALT  18  /  AlkPhos  52  11-15          CAPILLARY BLOOD GLUCOSE        PT/INR - ( 15 Nov 2019 18:28 )   PT: 12.3 sec;   INR: 1.10 ratio         PTT - ( 15 Nov 2019 18:28 )  PTT:26.3 sec    CULTURES:        Physical Examination:  General: No acute distress.    HEENT: Pupils equal, reactive to light.  Symmetric.  PULM: Clear to auscultation bilaterally, no significant sputum production  CVS: Regular rate and rhythm, no murmurs, rubs, or gallops  BREASTS: no swelling, sutures intact, no discharge or bleeding noted, 4 FRANCIS drains noted, 2 on each side, draining serosanguineous fluid  ABD: Soft, nondistended, nontender, normoactive bowel sounds, no masses; surgical site covered with abdominal pd, 2 FRANCIS drains with serosanguineous fluid  EXT: No edema, nontender  SKIN: Warm and well perfused, no rashes noted.  NEURO: Alert, oriented, interactive, nonfocal    CRITICAL CARE TIME SPENT: 35 minutes

## 2019-11-15 NOTE — CONSULT NOTE ADULT - GASTROINTESTINAL COMMENTS
incision site covered with abdominal pad, clean, no soakage noted incision site covered with abdominal pad, clean, no soakage noted; 2 FRANCIS drains draining serosanguineous fluid

## 2019-11-15 NOTE — CONSULT NOTE ADULT - CARDIOVASCULAR
detailed exam Localized Dermabrasion With Wire Brush Text: The patient was draped in routine manner.  Localized dermabrasion using 3 x 17 mm wire brush was performed in routine manner to papillary dermis. This spot dermabrasion is being performed to complete skin cancer reconstruction. It also will eliminate the other sun damaged precancerous cells that are known to be part of the regional effect of a lifetime's worth of sun exposure. This localized dermabrasion is therapeutic and should not be considered cosmetic in any regard. Localized Dermabrasion Text: The patient was draped in routine manner.  Localized dermabrasion using 3 x 17 mm wire brush was performed in routine manner to papillary dermis. This spot dermabrasion is being performed to complete skin cancer reconstruction. It also will eliminate the other sun damaged precancerous cells that are known to be part of the regional effect of a lifetime's worth of sun exposure. This localized dermabrasion is therapeutic and should not be considered cosmetic in any regard.

## 2019-11-15 NOTE — BRIEF OPERATIVE NOTE - NSICDXBRIEFPROCEDURE_GEN_ALL_CORE_FT
PROCEDURES:  Left modified radical mastectomy with sentinel node biopsy 15-Nov-2019 10:22:51  Kelsea Hardy  Right simple mastectomy 15-Nov-2019 10:22:21  Kelsea Hardy
PROCEDURES:  Breast reconstruction with ROD free flap 15-Nov-2019 17:16:43  Miguel Escobar

## 2019-11-15 NOTE — BRIEF OPERATIVE NOTE - OPERATION/FINDINGS
see dictation
PATIENT S/P BILATERAL BREAST MASTECTOMY WITH LEFT SENTINEL LYMPH NODE FOR LEFT BREAST CANCER  PATIENT UNDERWENT BILATERAL ROD (ABDOMINAL BASED FLAP) RECONSTRUCTION   PATIENT HAD MESH PLACEMENT IN ABDOMEN

## 2019-11-15 NOTE — CHART NOTE - NSCHARTNOTEFT_GEN_A_CORE
Notified bt nurse that patients Urine is green in color, asked the patient if she is having any symptoms , no symptoms reported. Called Surgery , they told us they have used Methylene Blue during the procedure .

## 2019-11-15 NOTE — CONSULT NOTE ADULT - BREASTS COMMENTS
no swelling, surgical site discharge or bleeding noted, sutures intact no swelling, surgical site discharge or bleeding noted, sutures intact; 2JP drains on R and 2 on L, all 4 draining serosanguineous fluid

## 2019-11-15 NOTE — BRIEF OPERATIVE NOTE - NSICDXBRIEFPOSTOP_GEN_ALL_CORE_FT
POST-OP DIAGNOSIS:  Malignant neoplasm of unspecified site of unspecified female breast 15-Nov-2019 10:25:53  Kelsea Hardy
POST-OP DIAGNOSIS:  Malignant neoplasm of unspecified site of unspecified female breast 15-Nov-2019 10:25:53  Kelsea Hardy

## 2019-11-15 NOTE — BRIEF OPERATIVE NOTE - NSICDXBRIEFPREOP_GEN_ALL_CORE_FT
PRE-OP DIAGNOSIS:  Malignant neoplasm of unspecified site of unspecified female breast 15-Nov-2019 10:25:32  Kelsea Hardy
PRE-OP DIAGNOSIS:  Malignant neoplasm of unspecified site of unspecified female breast 15-Nov-2019 10:25:32  Kelsea Hardy

## 2019-11-16 LAB
ALBUMIN SERPL ELPH-MCNC: 2.1 G/DL — LOW (ref 3.5–5)
ALP SERPL-CCNC: 42 U/L — SIGNIFICANT CHANGE UP (ref 40–120)
ALT FLD-CCNC: 15 U/L DA — SIGNIFICANT CHANGE UP (ref 10–60)
ANION GAP SERPL CALC-SCNC: 6 MMOL/L — SIGNIFICANT CHANGE UP (ref 5–17)
AST SERPL-CCNC: 35 U/L — SIGNIFICANT CHANGE UP (ref 10–40)
BASOPHILS # BLD AUTO: 0.03 K/UL — SIGNIFICANT CHANGE UP (ref 0–0.2)
BASOPHILS # BLD AUTO: 0.05 K/UL — SIGNIFICANT CHANGE UP (ref 0–0.2)
BASOPHILS NFR BLD AUTO: 0.2 % — SIGNIFICANT CHANGE UP (ref 0–2)
BASOPHILS NFR BLD AUTO: 0.3 % — SIGNIFICANT CHANGE UP (ref 0–2)
BILIRUB SERPL-MCNC: 0.4 MG/DL — SIGNIFICANT CHANGE UP (ref 0.2–1.2)
BUN SERPL-MCNC: 7 MG/DL — SIGNIFICANT CHANGE UP (ref 7–18)
CALCIUM SERPL-MCNC: 7.6 MG/DL — LOW (ref 8.4–10.5)
CHLORIDE SERPL-SCNC: 105 MMOL/L — SIGNIFICANT CHANGE UP (ref 96–108)
CO2 SERPL-SCNC: 27 MMOL/L — SIGNIFICANT CHANGE UP (ref 22–31)
CREAT SERPL-MCNC: 0.6 MG/DL — SIGNIFICANT CHANGE UP (ref 0.5–1.3)
EOSINOPHIL # BLD AUTO: 0.01 K/UL — SIGNIFICANT CHANGE UP (ref 0–0.5)
EOSINOPHIL # BLD AUTO: 0.07 K/UL — SIGNIFICANT CHANGE UP (ref 0–0.5)
EOSINOPHIL NFR BLD AUTO: 0.1 % — SIGNIFICANT CHANGE UP (ref 0–6)
EOSINOPHIL NFR BLD AUTO: 0.5 % — SIGNIFICANT CHANGE UP (ref 0–6)
GLUCOSE SERPL-MCNC: 143 MG/DL — HIGH (ref 70–99)
HCT VFR BLD CALC: 25.8 % — LOW (ref 34.5–45)
HCT VFR BLD CALC: 27 % — LOW (ref 34.5–45)
HCT VFR BLD CALC: 29.7 % — LOW (ref 34.5–45)
HGB BLD-MCNC: 7.9 G/DL — LOW (ref 11.5–15.5)
HGB BLD-MCNC: 8.4 G/DL — LOW (ref 11.5–15.5)
HGB BLD-MCNC: 9.1 G/DL — LOW (ref 11.5–15.5)
IMM GRANULOCYTES NFR BLD AUTO: 0.3 % — SIGNIFICANT CHANGE UP (ref 0–1.5)
IMM GRANULOCYTES NFR BLD AUTO: 0.4 % — SIGNIFICANT CHANGE UP (ref 0–1.5)
LACTATE SERPL-SCNC: 2.2 MMOL/L — HIGH (ref 0.7–2)
LYMPHOCYTES # BLD AUTO: 0.98 K/UL — LOW (ref 1–3.3)
LYMPHOCYTES # BLD AUTO: 12.3 % — LOW (ref 13–44)
LYMPHOCYTES # BLD AUTO: 2.03 K/UL — SIGNIFICANT CHANGE UP (ref 1–3.3)
LYMPHOCYTES # BLD AUTO: 6.7 % — LOW (ref 13–44)
MAGNESIUM SERPL-MCNC: 1.7 MG/DL — SIGNIFICANT CHANGE UP (ref 1.6–2.6)
MCHC RBC-ENTMCNC: 20.2 PG — LOW (ref 27–34)
MCHC RBC-ENTMCNC: 20.2 PG — LOW (ref 27–34)
MCHC RBC-ENTMCNC: 20.4 PG — LOW (ref 27–34)
MCHC RBC-ENTMCNC: 30.6 GM/DL — LOW (ref 32–36)
MCHC RBC-ENTMCNC: 30.6 GM/DL — LOW (ref 32–36)
MCHC RBC-ENTMCNC: 31.1 GM/DL — LOW (ref 32–36)
MCV RBC AUTO: 65.7 FL — LOW (ref 80–100)
MCV RBC AUTO: 66 FL — LOW (ref 80–100)
MCV RBC AUTO: 66 FL — LOW (ref 80–100)
MONOCYTES # BLD AUTO: 1.03 K/UL — HIGH (ref 0–0.9)
MONOCYTES # BLD AUTO: 1.12 K/UL — HIGH (ref 0–0.9)
MONOCYTES NFR BLD AUTO: 6.8 % — SIGNIFICANT CHANGE UP (ref 2–14)
MONOCYTES NFR BLD AUTO: 7.1 % — SIGNIFICANT CHANGE UP (ref 2–14)
NEUTROPHILS # BLD AUTO: 12.42 K/UL — HIGH (ref 1.8–7.4)
NEUTROPHILS # BLD AUTO: 13.27 K/UL — HIGH (ref 1.8–7.4)
NEUTROPHILS NFR BLD AUTO: 80.1 % — HIGH (ref 43–77)
NEUTROPHILS NFR BLD AUTO: 85.2 % — HIGH (ref 43–77)
NRBC # BLD: 0 /100 WBCS — SIGNIFICANT CHANGE UP (ref 0–0)
PHOSPHATE SERPL-MCNC: 2.6 MG/DL — SIGNIFICANT CHANGE UP (ref 2.5–4.5)
PLATELET # BLD AUTO: 290 K/UL — SIGNIFICANT CHANGE UP (ref 150–400)
PLATELET # BLD AUTO: 301 K/UL — SIGNIFICANT CHANGE UP (ref 150–400)
PLATELET # BLD AUTO: 302 K/UL — SIGNIFICANT CHANGE UP (ref 150–400)
POTASSIUM SERPL-MCNC: 3.6 MMOL/L — SIGNIFICANT CHANGE UP (ref 3.5–5.3)
POTASSIUM SERPL-SCNC: 3.6 MMOL/L — SIGNIFICANT CHANGE UP (ref 3.5–5.3)
PROT SERPL-MCNC: 5.1 G/DL — LOW (ref 6–8.3)
RBC # BLD: 3.91 M/UL — SIGNIFICANT CHANGE UP (ref 3.8–5.2)
RBC # BLD: 4.11 M/UL — SIGNIFICANT CHANGE UP (ref 3.8–5.2)
RBC # BLD: 4.5 M/UL — SIGNIFICANT CHANGE UP (ref 3.8–5.2)
RBC # FLD: 14.4 % — SIGNIFICANT CHANGE UP (ref 10.3–14.5)
RBC # FLD: 14.6 % — HIGH (ref 10.3–14.5)
RBC # FLD: 14.6 % — HIGH (ref 10.3–14.5)
SODIUM SERPL-SCNC: 138 MMOL/L — SIGNIFICANT CHANGE UP (ref 135–145)
WBC # BLD: 14.57 K/UL — HIGH (ref 3.8–10.5)
WBC # BLD: 16.55 K/UL — HIGH (ref 3.8–10.5)
WBC # BLD: 16.9 K/UL — HIGH (ref 3.8–10.5)
WBC # FLD AUTO: 14.57 K/UL — HIGH (ref 3.8–10.5)
WBC # FLD AUTO: 16.55 K/UL — HIGH (ref 3.8–10.5)
WBC # FLD AUTO: 16.9 K/UL — HIGH (ref 3.8–10.5)

## 2019-11-16 RX ORDER — ACETAMINOPHEN 500 MG
1000 TABLET ORAL ONCE
Refills: 0 | Status: COMPLETED | OUTPATIENT
Start: 2019-11-16 | End: 2019-11-16

## 2019-11-16 RX ORDER — MAGNESIUM SULFATE 500 MG/ML
1 VIAL (ML) INJECTION ONCE
Refills: 0 | Status: COMPLETED | OUTPATIENT
Start: 2019-11-16 | End: 2019-11-16

## 2019-11-16 RX ORDER — POTASSIUM CHLORIDE 20 MEQ
40 PACKET (EA) ORAL ONCE
Refills: 0 | Status: COMPLETED | OUTPATIENT
Start: 2019-11-16 | End: 2019-11-16

## 2019-11-16 RX ADMIN — SODIUM CHLORIDE 1000 MILLILITER(S): 9 INJECTION, SOLUTION INTRAVENOUS at 00:17

## 2019-11-16 RX ADMIN — PANTOPRAZOLE SODIUM 40 MILLIGRAM(S): 20 TABLET, DELAYED RELEASE ORAL at 11:12

## 2019-11-16 RX ADMIN — CHLORHEXIDINE GLUCONATE 1 APPLICATION(S): 213 SOLUTION TOPICAL at 17:50

## 2019-11-16 RX ADMIN — HYDROMORPHONE HYDROCHLORIDE 1 MILLIGRAM(S): 2 INJECTION INTRAMUSCULAR; INTRAVENOUS; SUBCUTANEOUS at 01:30

## 2019-11-16 RX ADMIN — SODIUM CHLORIDE 3 MILLILITER(S): 9 INJECTION INTRAMUSCULAR; INTRAVENOUS; SUBCUTANEOUS at 14:42

## 2019-11-16 RX ADMIN — SODIUM CHLORIDE 100 MILLILITER(S): 9 INJECTION, SOLUTION INTRAVENOUS at 17:49

## 2019-11-16 RX ADMIN — Medication 1000 MILLIGRAM(S): at 15:54

## 2019-11-16 RX ADMIN — Medication 400 MILLIGRAM(S): at 15:24

## 2019-11-16 RX ADMIN — Medication 40 MILLIEQUIVALENT(S): at 03:47

## 2019-11-16 RX ADMIN — HYDROMORPHONE HYDROCHLORIDE 1 MILLIGRAM(S): 2 INJECTION INTRAMUSCULAR; INTRAVENOUS; SUBCUTANEOUS at 06:00

## 2019-11-16 RX ADMIN — SODIUM CHLORIDE 100 MILLILITER(S): 9 INJECTION, SOLUTION INTRAVENOUS at 05:43

## 2019-11-16 RX ADMIN — HYDROMORPHONE HYDROCHLORIDE 1 MILLIGRAM(S): 2 INJECTION INTRAMUSCULAR; INTRAVENOUS; SUBCUTANEOUS at 05:42

## 2019-11-16 RX ADMIN — HYDROMORPHONE HYDROCHLORIDE 1 MILLIGRAM(S): 2 INJECTION INTRAMUSCULAR; INTRAVENOUS; SUBCUTANEOUS at 11:09

## 2019-11-16 RX ADMIN — Medication 400 MILLIGRAM(S): at 22:59

## 2019-11-16 RX ADMIN — SODIUM CHLORIDE 3 MILLILITER(S): 9 INJECTION INTRAMUSCULAR; INTRAVENOUS; SUBCUTANEOUS at 05:37

## 2019-11-16 RX ADMIN — HYDROMORPHONE HYDROCHLORIDE 1 MILLIGRAM(S): 2 INJECTION INTRAMUSCULAR; INTRAVENOUS; SUBCUTANEOUS at 01:50

## 2019-11-16 RX ADMIN — ONDANSETRON 4 MILLIGRAM(S): 8 TABLET, FILM COATED ORAL at 03:54

## 2019-11-16 RX ADMIN — Medication 1000 MILLIGRAM(S): at 23:30

## 2019-11-16 RX ADMIN — Medication 100 GRAM(S): at 03:46

## 2019-11-16 RX ADMIN — HYDROMORPHONE HYDROCHLORIDE 1 MILLIGRAM(S): 2 INJECTION INTRAMUSCULAR; INTRAVENOUS; SUBCUTANEOUS at 20:04

## 2019-11-16 RX ADMIN — HYDROMORPHONE HYDROCHLORIDE 1 MILLIGRAM(S): 2 INJECTION INTRAMUSCULAR; INTRAVENOUS; SUBCUTANEOUS at 11:39

## 2019-11-16 RX ADMIN — Medication 100 MILLIGRAM(S): at 05:38

## 2019-11-16 RX ADMIN — HYDROMORPHONE HYDROCHLORIDE 1 MILLIGRAM(S): 2 INJECTION INTRAMUSCULAR; INTRAVENOUS; SUBCUTANEOUS at 20:34

## 2019-11-16 RX ADMIN — SODIUM CHLORIDE 3 MILLILITER(S): 9 INJECTION INTRAMUSCULAR; INTRAVENOUS; SUBCUTANEOUS at 21:18

## 2019-11-16 RX ADMIN — ENOXAPARIN SODIUM 40 MILLIGRAM(S): 100 INJECTION SUBCUTANEOUS at 14:41

## 2019-11-16 NOTE — PROGRESS NOTE ADULT - ASSESSMENT
44y.o. Female s/p b/l mastectomy with ROD flaps    -NPO except ice chips  -Lovenox 40mg daily  -IVF  -Pain control prn  -DVT ppx  -Incentive spirometry   -All drains stripped at bedside, continue to declot every 2 hours  -Monitor vioptics qhr

## 2019-11-16 NOTE — PROGRESS NOTE ADULT - SUBJECTIVE AND OBJECTIVE BOX
Plastic Surgery    Subjective:  Pt resting comfortably. No acute complaints  Tolerating pain with meds.  Denies N/V  Rodriguez in place.     T(C): 36.3 (11-15-19 @ 06:45), Max: 36.3 (11-15-19 @ 06:45)  HR: 99 (11-15-19 @ 22:00) (96 - 110)  BP: 90/61 (11-15-19 @ 22:00) (90/61 - 125/77)  RR: 14 (11-15-19 @ 22:00) (12 - 25)  SpO2: 98% (11-15-19 @ 22:00) (98% - 100%)    Physical:  Gen: A&O x3. NAD  Abd: Soft ND, Dressing C/D/I. John drains in place.  Breast: Vioptics in place. Flaps soft, nontender, without skin discoloration b/l. Rest of breast soft, nontender. Vioptics at 55% Right, 51% Left  All drains serosanguinous  Rodriguez UO blue tinged

## 2019-11-16 NOTE — PROGRESS NOTE ADULT - SUBJECTIVE AND OBJECTIVE BOX
INTERVAL HPI/OVERNIGHT EVENTS:  Pt stable.   Tolerating diet.   States incisional pain improvei    Vital Signs Last 24 Hrs  T(C): 38.3 (16 Nov 2019 15:00), Max: 38.3 (16 Nov 2019 15:00)  T(F): 100.9 (16 Nov 2019 15:00), Max: 100.9 (16 Nov 2019 15:00)  HR: 110 (16 Nov 2019 19:00) (95 - 116)  BP: 107/60 (16 Nov 2019 19:00) (90/61 - 119/65)  BP(mean): 70 (16 Nov 2019 19:00) (66 - 79)  RR: 20 (16 Nov 2019 17:00) (0 - 25)  SpO2: 97% (16 Nov 2019 19:00) (96% - 100%)    Physical:  Flaps viable  Abdomen: Soft nondistended    I&O's Summary    15 Nov 2019 07:01  -  16 Nov 2019 07:00  --------------------------------------------------------  IN: 1525 mL / OUT: 950 mL / NET: 575 mL    16 Nov 2019 07:01  -  16 Nov 2019 19:29  --------------------------------------------------------  IN: 1450 mL / OUT: 1740 mL / NET: -290 mL        LABS:                        9.1    16.90 )-----------( 302      ( 16 Nov 2019 09:44 )             29.7             11-16    138  |  105  |  7   ----------------------------<  143<H>  3.6   |  27  |  0.60    Ca    7.6<L>      16 Nov 2019 02:36  Phos  2.6     11-16  Mg     1.7     11-16    TPro  5.1<L>  /  Alb  2.1<L>  /  TBili  0.4  /  DBili  x   /  AST  35  /  ALT  15  /  AlkPhos  42  11-16

## 2019-11-16 NOTE — PROGRESS NOTE ADULT - ASSESSMENT
POD#1 - s/p bilateral breast reconstruction ROD flaps  Flaps with no evidence of arterial or venous issues    - Cont flap check q1hr  - Cont Vioptix  - Reg diet - NO CAFFEINE  - Cont Tylenol IV  - Patient with allergy to NSAID  - Keep HOB elevated  - Abd binder and bra at bedside  - Cont DVT prophylaxis  - If stable today, likely tx to floor tomorrow

## 2019-11-16 NOTE — PROGRESS NOTE ADULT - ASSESSMENT
Patient is a 43yo female with no significant PMH, PSH of  and D&C, presented to surgery for breast surgery. Patient reports finding a left sided breast mass in 2019, underwent mammography and biopsy in 2019 and was diagnosed with breast cancer.     Patient underwent right sided simple mastectomy, left modified mastectomy with sentinel lymph node biopsy and breast reconstruction with ROD (abdominal based flap) with abdominal mesh placement. She had an estimated blood loss of 200cc, was given 6l fluids intra-op with urine output of 6l. Patient reports feeling nauseous and slightly sleepy from residual effect of anesthesia, otherwise denies pain or any other symptoms.    Discussed with surgeon, Dr Curtis, post op who recommends to keep patient npo, and monitor Bioptics saturation: currently on 52% on right side and 49% on left side.     Pt admitted to the ICU for Post op/ frequent bioptics monitoring     Assessment:  -Bilateral Mastectomy Breast reconstruction w/ ROD  -Monitor Bioptics saturation- if a drop 10% or greater contact Dr. Curtis and Surgical house officer   -Currently sat is good R is 57% and Left is 53%    Plan:  Neuro:  -Extubated safely- currently at baseline mental status  -Pain control with Dilaudid IV , and IV tylenol    CV:  -Post op hypovolemia- s/p 6L NS intra op  -1LR bolus overnight  -c/w LR @ 125ml/hr  -monitor urine output with hess catheter   -Tele monitoring for now     Pulm:  -Currently pt saturating 99% on room air   -No pulmonary concern at this time       ID:  -Post of Lactate of 2.8  -Lactate trended up to 3.3, 1 LR bolus given .  -will follow repeat lactate    Nephro:  -c/w IV hydration and monitor UOP  -f/u BMP daily     GI:  -NPO for now- will adv diet per surgery recs    Heme:  -no indication for transfusion     Endo:  -target CBG < 180    Prophy:  -Lovenox for DVT prophy     Dispo:  - monitor in the ICU at this time.  If pt remains stable- will likely be downgraded to surgical service tomorrow     [facility]   FULL CODE Patient is a 43yo female with no significant PMH, PSH of  and D&C, presented to surgery for breast surgery. Patient reports finding a left sided breast mass in 2019, underwent mammography and biopsy in 2019 and was diagnosed with breast cancer.     Patient underwent right sided simple mastectomy, left modified mastectomy with sentinel lymph node biopsy and breast reconstruction with ROD (abdominal based flap) with abdominal mesh placement. She had an estimated blood loss of 200cc, was given 6l fluids intra-op with urine output of 6l. Patient reports feeling nauseous and slightly sleepy from residual effect of anesthesia, otherwise denies pain or any other symptoms.    Discussed with surgeon, Dr Curtis, post op who recommends to keep patient npo, and monitor Bioptics saturation: currently on 52% on right side and 49% on left side.     Pt admitted to the ICU for Post op/ frequent bioptics monitoring     Assessment:  -Bilateral Mastectomy Breast reconstruction w/ ROD  -Monitor Bioptics saturation- if a drop 10% or greater contact Dr. Curtis and Surgical house officer   -Currently sat is good R is 57% and Left is 53%    Plan:  Neuro:  -Extubated safely- currently at baseline mental status  -Pain control with Dilaudid IV , and IV tylenol    CV:  -Post op hypovolemia- s/p 6L NS intra op  -1LR bolus overnight  -c/w LR @ 100 ml/hr  -monitor urine output with hess catheter   -Tele monitoring for now   -avoid use of pressors and contact Kirsten Boone if pressure is dropping     Pulm:  -Currently pt saturating 99% on room air   -No pulmonary concern at this time       ID:  -Post op Lactate of 2.8  -Lactate trended up to 3.3, 1 LR bolus given .->2.3    Nephro:  -c/w IV hydration and monitor UOP  -f/u BMP daily     GI:  -diet advanced to regular-   - no Caffeine,     Heme:  -no indication for transfusion   - continue to monitor h/h 9.8->8.4-9.1  - WBC 20->14->16 -s/p 3 doses od ancef     Endo:  -target CBG < 180    Prophy:  -Lovenox for DVT prophy     Dispo:  - monitor in the ICU at this time.  If pt remains stable- will likely be downgraded to surgical service tomorrow   - OOB tomorrow    [facility]   FULL CODE

## 2019-11-16 NOTE — PROGRESS NOTE ADULT - ASSESSMENT
seen and examined  ca breast s/p breast surgery and skin grafting from abdomen  vs stable afebrile physical unchaged    wbc 16.5 but afebrile  pt denies dysurea, no cough, no abd pain  pt has some pain at surgical site  pain managment  wbc high no evidence of infection  if spike panculture, surgical f/u and start antibx  and getr ID  DVT prophylaxis  labs in am

## 2019-11-16 NOTE — PROGRESS NOTE ADULT - SUBJECTIVE AND OBJECTIVE BOX
No significant events overnight  Pain under control  No SOB, or CP    ICU Vital Signs Last 24 Hrs  T(C): 37.7 (16 Nov 2019 04:35), Max: 37.7 (16 Nov 2019 04:35)  T(F): 99.8 (16 Nov 2019 04:35), Max: 99.8 (16 Nov 2019 04:35)  HR: 103 (16 Nov 2019 06:00) (95 - 116)  BP: 103/63 (16 Nov 2019 06:00) (90/61 - 119/67)  BP(mean): 72 (16 Nov 2019 06:00) (67 - 79)  ABP: --  ABP(mean): --  RR: 0 (16 Nov 2019 06:00) (0 - 25)  SpO2: 97% (16 Nov 2019 06:00) (96% - 100%)                            8.4    14.57 )-----------( 301      ( 16 Nov 2019 02:36 )             27.0     Breast - flaps are soft, warm, pink, Vioptix 55% and 59%, martine serosang  Abd - incision cdi, martine serosang

## 2019-11-16 NOTE — PROGRESS NOTE ADULT - SUBJECTIVE AND OBJECTIVE BOX
INTERVAL HPI/OVERNIGHT EVENTS:       Antimicrobial:    Cardiovascular:    Pulmonary:    Hematalogic:  enoxaparin Injectable 40 milliGRAM(s) SubCutaneous daily    Other:  acetaminophen  IVPB .. 1000 milliGRAM(s) IV Intermittent once PRN  chlorhexidine 4% Liquid 1 Application(s) Topical daily  HYDROmorphone  Injectable 1 milliGRAM(s) IV Push every 4 hours PRN  lactated ringers. 1000 milliLiter(s) IV Continuous <Continuous>  pantoprazole  Injectable 40 milliGRAM(s) IV Push daily  sodium chloride 0.9% lock flush 3 milliLiter(s) IV Push every 8 hours      Drug Dosing Weight  Height (cm): 147.3 (15 Nov 2019 17:30)  Weight (kg): 59.9 (15 Nov 2019 17:30)  BMI (kg/m2): 27.6 (15 Nov 2019 17:30)  BSA (m2): 1.53 (15 Nov 2019 17:30)    CENTRAL LINE: [ ] YES [ ] NO  LOCATION:   DATE INSERTED:    HESS: [ ] YES [ ] NO    DATE INSERTED:    A-LINE:  [ ] YES [ ] NO  LOCATION:   DATE INSERTED:    PMH/Social Hx/Fam Hx -reviewed admission note, no change since admission  PAST MEDICAL & SURGICAL HISTORY:  Malignant neoplasm of unspecified site of unspecified female breast  S/P dilation and curettage  S/P bilateral breast biopsy  H/O  section: x 1      T(C): 37.8 (19 @ 08:00), Max: 37.8 (19 @ 08:00)  HR: 112 (19 @ 12:00)  BP: 118/66 (19 @ 12:00)  BP(mean): 77 (19 @ 12:00)  ABP: --  ABP(mean): --  RR: 20 (19 @ 12:00)  SpO2: 96% (19 @ 12:00)  Wt(kg): --          11-15 @ 07:01  -   @ 07:00  --------------------------------------------------------  IN: 1525 mL / OUT: 950 mL / NET: 575 mL            PHYSICAL EXAM:    GENERAL: No signs of distress, comfortable  HEAD: Atraumatic, Normocephalic  EYES: EOMI, PERRLA  ENMT: No erythema, exudates, or enlargement, Moist mucous membranes  NECK: Supple, normal appearance, No JVD; [  ] central line (if applicable)  CHEST/LUNG: No chest deformity, fair bilateral air entry; No rales, rhonchi, wheezing; crackles  BREAST: b/l dressing with O2 sensor   HEART: Regular rate and rhythm; No murmurs, rubs, or gallops;   ABDOMEN: Soft, Nontender, Nondistended; Bowel sounds present  EXTREMITIES:  + Peripheral Pulses, No clubbing, cyanosis, or edema  NERVOUS SYSTEM: awake and alert x 3, follows commands, upper and lower extremities  LYMPH: No lymphadenopathy noted  SKIN: No rashes or lesions; good turgor, warm, dry      LABS:  CBC Full  -  ( 2019 09:44 )  WBC Count : 16.90 K/uL  RBC Count : 4.50 M/uL  Hemoglobin : 9.1 g/dL  Hematocrit : 29.7 %  Platelet Count - Automated : 302 K/uL  Mean Cell Volume : 66.0 fl  Mean Cell Hemoglobin : 20.2 pg  Mean Cell Hemoglobin Concentration : 30.6 gm/dL  Auto Neutrophil # : x  Auto Lymphocyte # : x  Auto Monocyte # : x  Auto Eosinophil # : x  Auto Basophil # : x  Auto Neutrophil % : x  Auto Lymphocyte % : x  Auto Monocyte % : x  Auto Eosinophil % : x  Auto Basophil % : x    11-16    138  |  105  |  7   ----------------------------<  143<H>  3.6   |  27  |  0.60    Ca    7.6<L>      2019 02:36  Phos  2.6     11-16  Mg     1.7     -16    TPro  5.1<L>  /  Alb  2.1<L>  /  TBili  0.4  /  DBili  x   /  AST  35  /  ALT  15  /  AlkPhos  42  11-16    PT/INR - ( 15 Nov 2019 18:28 )   PT: 12.3 sec;   INR: 1.10 ratio         PTT - ( 15 Nov 2019 18:28 )  PTT:26.3 sec        RADIOLOGY & ADDITIONAL STUDIES REVIEWED         IMPRESSION:  PAST MEDICAL & SURGICAL HISTORY:  Malignant neoplasm of unspecified site of unspecified female breast  S/P dilation and curettage  S/P bilateral breast biopsy  H/O  section: x 1   p/w           IM{: This is a 44 yr old woman  with no significant PMH, PSH of  and D&C, presented to surgery for breast surgery. Patient reports finding a left sided breast mass in 2019, underwent mammography and biopsy in 2019 and was diagnosed with breast cancer.     11/15 Patient underwent right sided simple mastectomy, left modified mastectomy with sentinel lymph node biopsy and breast reconstruction with ROD (abdominal based flap) with abdominal mesh placement. She had an estimated blood loss of 200cc, was given 6l fluids intra-op with urine output of 6l. Patient reports feeling nauseous and slightly sleepy from residual effect of anesthesia, otherwise denies pain or any other symptoms.        Pt admitted to the ICU for Post op/ frequent bioptics monitoring     Assessment:  -Bilateral Mastectomy Breast reconstruction w/ ROD  -Monitor Bioptics saturation- if a drop 10% or greater contact Dr. Curtis and Surgical house officer   -Currently sat is good R is 57% and Left is 53%    Plan:  Neuro:  -Extubated safely- currently at baseline mental status  -Pain control with Dilaudid IV , and IV tylenol    CV:  -Post op hypovolemia- s/p 6L NS intra op  -1LR bolus overnight  -c/w LR @ 100 ml/hr  -monitor urine output with hess catheter   -Tele monitoring for now   -avoid use of pressors and contact Kirsten Boone if pressure is dropping     Pulm:  -Currently pt saturating 99% on room air   -No pulmonary concern at this time       ID:  -Post op Lactate of 2.8  -Lactate trended up to 3.3, 1 LR bolus given .->2.3    Nephro:  -c/w IV hydration and monitor UOP  -f/u BMP daily     GI:  -diet advanced to regular-   - no Caffeine,     Heme:  -no indication for transfusion   - continue to monitor h/h 9.8->8.4-9.1  - WBC 20->14->16 -s/p 3 doses od ancef     Endo:  -target CBG < 180    Prophy:  -Lovenox for DVT prophy     Dispo:  - monitor in the ICU at this time.  If pt remains stable- will likely be downgraded to surgical service tomorrow   - OOB tomorrow    [facility]   FULL CODE          GOALS OF CARE DISCUSSION WITH PATIENT/FAMILY/PROXY/ icu team on rounds    CRITICAL CARE TIME SPENT: 35 minutes

## 2019-11-16 NOTE — PROGRESS NOTE ADULT - SUBJECTIVE AND OBJECTIVE BOX
HPI:  43 yo female with no significant PMHx reports the above. Patient denies pain, discharge in her breast.  She is scheduled for Left Modified Mastectomy and Right Simple Mastectomy with [place] Lymph node Biopsy  Bilateral Breast Reconstruction with Dep Flap Possible Mesh, on 11/15/19 (08 Nov 2019 17:40)      Patient is a 44y old  Female who presents with a chief complaint of b/l breast reconstruction surgery (16 Nov 2019 12:42)      INTERVAL HPI/OVERNIGHT EVENTS:  T(C): 38.3 (11-16-19 @ 15:00), Max: 38.3 (11-16-19 @ 15:00)  HR: 108 (11-16-19 @ 20:00) (95 - 116)  BP: 111/63 (11-16-19 @ 20:00) (90/61 - 119/65)  RR: 20 (11-16-19 @ 20:00) (0 - 24)  SpO2: 98% (11-16-19 @ 20:00) (96% - 100%)  Wt(kg): --  I&O's Summary    15 Nov 2019 07:01  -  16 Nov 2019 07:00  --------------------------------------------------------  IN: 1525 mL / OUT: 950 mL / NET: 575 mL    16 Nov 2019 07:01  -  16 Nov 2019 20:53  --------------------------------------------------------  IN: 1450 mL / OUT: 1740 mL / NET: -290 mL        REVIEW OF SYSTEMS: denies fever, chills, SOB, palpitations, chest pain, abdominal pain, nausea, vomitting, diarrhea, constipation, dizziness    MEDICATIONS  (STANDING):  chlorhexidine 4% Liquid 1 Application(s) Topical daily  enoxaparin Injectable 40 milliGRAM(s) SubCutaneous daily  lactated ringers. 1000 milliLiter(s) (100 mL/Hr) IV Continuous <Continuous>  pantoprazole  Injectable 40 milliGRAM(s) IV Push daily  sodium chloride 0.9% lock flush 3 milliLiter(s) IV Push every 8 hours    MEDICATIONS  (PRN):  HYDROmorphone  Injectable 1 milliGRAM(s) IV Push every 4 hours PRN Severe Pain (7 - 10)      PHYSICAL EXAM:  GENERAL: NAD, well-groomed, well-developed  HEAD:  Atraumatic, Normocephalic  EYES: EOMI, PERRLA, conjunctiva and sclera clear  ENMT: No tonsillar erythema, exudates, or enlargement; Moist mucous membranes, Good dentition, No lesions  NECK: Supple, No JVD, Normal thyroid  NERVOUS SYSTEM:  Alert & Oriented X3, Good concentration; Motor Strength 5/5 B/L upper and lower extremities; DTRs 2+ intact and symmetric  CHEST/LUNG: Clear to percussion bilaterally; No rales, rhonchi, wheezing, or rubs  HEART: Regular rate and rhythm; No murmurs, rubs, or gallops  ABDOMEN: Soft, Nontender, Nondistended; Bowel sounds present  EXTREMITIES:  2+ Peripheral Pulses, No clubbing, cyanosis, or edema  LYMPH: No lymphadenopathy noted  SKIN: No rashes or lesions  LABS:                        7.9    16.55 )-----------( 290      ( 16 Nov 2019 19:37 )             25.8     11-16    138  |  105  |  7   ----------------------------<  143<H>  3.6   |  27  |  0.60    Ca    7.6<L>      16 Nov 2019 02:36  Phos  2.6     11-16  Mg     1.7     11-16    TPro  5.1<L>  /  Alb  2.1<L>  /  TBili  0.4  /  DBili  x   /  AST  35  /  ALT  15  /  AlkPhos  42  11-16    PT/INR - ( 15 Nov 2019 18:28 )   PT: 12.3 sec;   INR: 1.10 ratio         PTT - ( 15 Nov 2019 18:28 )  PTT:26.3 sec    CAPILLARY BLOOD GLUCOSE

## 2019-11-16 NOTE — PROGRESS NOTE ADULT - SUBJECTIVE AND OBJECTIVE BOX
INTERVAL HPI/OVERNIGHT EVENTS: Patients Lactate trended up from 2.7 to 3.3, gave her 1 more bolus of LR will follow Repeat Lactate    PRESSORS: [ ] YES [ x] NO  WHICH:    ANTIBIOTICS:                      Antimicrobial:  ceFAZolin   IVPB 2000 milliGRAM(s) IV Intermittent every 8 hours    Cardiovascular:    Pulmonary:    Hematalogic:  enoxaparin Injectable 40 milliGRAM(s) SubCutaneous daily    Other:  acetaminophen  IVPB .. 1000 milliGRAM(s) IV Intermittent once PRN  chlorhexidine 4% Liquid 1 Application(s) Topical daily  HYDROmorphone  Injectable 1 milliGRAM(s) IV Push every 4 hours PRN  lactated ringers. 1000 milliLiter(s) IV Continuous <Continuous>  ondansetron Injectable 4 milliGRAM(s) IV Push once PRN  pantoprazole  Injectable 40 milliGRAM(s) IV Push daily  sodium chloride 0.9% lock flush 3 milliLiter(s) IV Push every 8 hours    acetaminophen  IVPB .. 1000 milliGRAM(s) IV Intermittent once PRN  ceFAZolin   IVPB 2000 milliGRAM(s) IV Intermittent every 8 hours  chlorhexidine 4% Liquid 1 Application(s) Topical daily  enoxaparin Injectable 40 milliGRAM(s) SubCutaneous daily  HYDROmorphone  Injectable 1 milliGRAM(s) IV Push every 4 hours PRN  lactated ringers. 1000 milliLiter(s) IV Continuous <Continuous>  ondansetron Injectable 4 milliGRAM(s) IV Push once PRN  pantoprazole  Injectable 40 milliGRAM(s) IV Push daily  sodium chloride 0.9% lock flush 3 milliLiter(s) IV Push every 8 hours    Drug Dosing Weight  Height (cm): 147.3 (15 Nov 2019 17:30)  Weight (kg): 59.9 (15 Nov 2019 17:30)  BMI (kg/m2): 27.6 (15 Nov 2019 17:30)  BSA (m2): 1.53 (15 Nov 2019 17:30)    CENTRAL LINE: [ ] YES [x ] NO  LOCATION:   DATE INSERTED:  REMOVE: [ ] YES [ ] NO  EXPLAIN:    MARIN: [ x] YES [ ] NO    DATE INSERTED:11/15  REMOVE:  [ ] YES [ ] NO  EXPLAIN:    A-LINE:  [ ] YES [ x] NO  LOCATION:   DATE INSERTED:  REMOVE:  [ ] YES [ ] NO  EXPLAIN:    PMH -reviewed admission note, no change since admission    ICU Vital Signs Last 24 Hrs  T(C): 36.3 (15 Nov 2019 06:45), Max: 36.3 (15 Nov 2019 06:45)  T(F): 97.4 (15 Nov 2019 06:45), Max: 97.4 (15 Nov 2019 06:45)  HR: 102 (16 Nov 2019 01:00) (95 - 110)  BP: 98/64 (16 Nov 2019 01:00) (90/61 - 125/77)  BP(mean): 72 (16 Nov 2019 01:00) (67 - 79)  ABP: --  ABP(mean): --  RR: 16 (16 Nov 2019 01:00) (12 - 25)  SpO2: 96% (16 Nov 2019 01:00) (96% - 100%)                    PHYSICAL EXAM:    GENERAL: NAD, well-groomed, well-developed  HEAD:  Atraumatic, Normocephalic  EYES: EOMI, PERRLA, conjunctiva and sclera clear  ENMT: No tonsillar erythema, exudates, or enlargement; Moist mucous membranes, Good dentition, No lesions  NECK: Supple, normal appearance, No JVD; Normal thyroid; Trachea midline  NERVOUS SYSTEM:  Alert & Oriented X3, Good concentration; Motor Strength 5/5 B/L upper and lower extremities; DTRs 2+ intact and symmetric  CHEST/LUNG:  Normal percussion bilaterally; No rales, rhonchi, wheezing   HEART: Regular rate and rhythm; No murmurs, rubs, or gallops  ABDOMEN: Soft, Nontender, Nondistended; Bowel sounds present  EXTREMITIES:  2+ Peripheral Pulses, No clubbing, cyanosis, or edema  LYMPH: No lymphadenopathy noted  SKIN: Post mastectomy, drains in place.      LABS:  CBC Full  -  ( 15 Nov 2019 18:28 )  WBC Count : 20.03 K/uL  RBC Count : 4.84 M/uL  Hemoglobin : 9.8 g/dL  Hematocrit : 31.8 %  Platelet Count - Automated : 359 K/uL  Mean Cell Volume : 65.7 fl  Mean Cell Hemoglobin : 20.2 pg  Mean Cell Hemoglobin Concentration : 30.8 gm/dL  Auto Neutrophil # : 16.42 K/uL  Auto Lymphocyte # : 2.20 K/uL  Auto Monocyte # : 1.00 K/uL  Auto Eosinophil # : 0.00 K/uL  Auto Basophil # : 0.00 K/uL  Auto Neutrophil % : 76.0 %  Auto Lymphocyte % : 11.0 %  Auto Monocyte % : 5.0 %  Auto Eosinophil % : 0.0 %  Auto Basophil % : 0.0 %    11-15    139  |  106  |  7   ----------------------------<  156<H>  3.6   |  24  |  0.64    Ca    7.4<L>      15 Nov 2019 18:28  Phos  3.6     11-15  Mg     1.3     11-15    TPro  5.6<L>  /  Alb  2.5<L>  /  TBili  0.6  /  DBili  x   /  AST  35  /  ALT  18  /  AlkPhos  52  11-15    PT/INR - ( 15 Nov 2019 18:28 )   PT: 12.3 sec;   INR: 1.10 ratio         PTT - ( 15 Nov 2019 18:28 )  PTT:26.3 sec        RADIOLOGY & ADDITIONAL STUDIES REVIEWED:  ***    GOALS OF CARE DISCUSSION WITH PATIENT/FAMILY/PROXY:    CRITICAL CARE TIME SPENT: 35 minutes INTERVAL HPI/OVERNIGHT EVENTS: Patients Lactate trended up from 2.7 to 3.3, gave her 1 more bolus of LR ->Repeat Lactate trended down to 2.2    PRESSORS: [ ] YES [ x] NO  WHICH:        Cardiovascular:    Pulmonary:    Hematalogic:  enoxaparin Injectable 40 milliGRAM(s) SubCutaneous daily    Other:  acetaminophen  IVPB .. 1000 milliGRAM(s) IV Intermittent once PRN  chlorhexidine 4% Liquid 1 Application(s) Topical daily  HYDROmorphone  Injectable 1 milliGRAM(s) IV Push every 4 hours PRN  lactated ringers. 1000 milliLiter(s) IV Continuous <Continuous>  ondansetron Injectable 4 milliGRAM(s) IV Push once PRN  pantoprazole  Injectable 40 milliGRAM(s) IV Push daily  sodium chloride 0.9% lock flush 3 milliLiter(s) IV Push every 8 hours    acetaminophen  IVPB .. 1000 milliGRAM(s) IV Intermittent once PRN  ceFAZolin   IVPB 2000 milliGRAM(s) IV Intermittent every 8 hours  chlorhexidine 4% Liquid 1 Application(s) Topical daily  enoxaparin Injectable 40 milliGRAM(s) SubCutaneous daily  HYDROmorphone  Injectable 1 milliGRAM(s) IV Push every 4 hours PRN  lactated ringers. 1000 milliLiter(s) IV Continuous <Continuous>  ondansetron Injectable 4 milliGRAM(s) IV Push once PRN  pantoprazole  Injectable 40 milliGRAM(s) IV Push daily  sodium chloride 0.9% lock flush 3 milliLiter(s) IV Push every 8 hours    Drug Dosing Weight  Height (cm): 147.3 (15 Nov 2019 17:30)  Weight (kg): 59.9 (15 Nov 2019 17:30)  BMI (kg/m2): 27.6 (15 Nov 2019 17:30)  BSA (m2): 1.53 (15 Nov 2019 17:30)    CENTRAL LINE: [ ] YES [x ] NO  LOCATION:   DATE INSERTED:  REMOVE: [ ] YES [ ] NO  EXPLAIN:    MARIN: [ x] YES [ ] NO    DATE INSERTED:11/15  REMOVE:  [ ] YES [ ] NO  EXPLAIN:    A-LINE:  [ ] YES [ x] NO  LOCATION:   DATE INSERTED:  REMOVE:  [ ] YES [ ] NO  EXPLAIN:    PMH -reviewed admission note, no change since admission    ICU Vital Signs Last 24 Hrs  T(C): 36.3 (15 Nov 2019 06:45), Max: 36.3 (15 Nov 2019 06:45)  T(F): 97.4 (15 Nov 2019 06:45), Max: 97.4 (15 Nov 2019 06:45)  HR: 102 (16 Nov 2019 01:00) (95 - 110)  BP: 98/64 (16 Nov 2019 01:00) (90/61 - 125/77)  BP(mean): 72 (16 Nov 2019 01:00) (67 - 79)  ABP: --  ABP(mean): --  RR: 16 (16 Nov 2019 01:00) (12 - 25)  SpO2: 96% (16 Nov 2019 01:00) (96% - 100%)                    PHYSICAL EXAM:    General: No acute distress.    HEENT: Pupils equal, reactive to light.  Symmetric.  PULM: Clear to auscultation bilaterally, no significant sputum production  CVS: Regular rate and rhythm, no murmurs, rubs, or gallops  BREASTS: no swelling, sutures intact, no discharge or bleeding noted, 6 FRANCIS drains noted, 2 on each side, and 2 on the abdomen draining serosanguineous fluid  ABD: Soft, nondistended, nontender, normoactive bowel sounds, no masses; surgical site covered with abdominal pd, 2 FRANCIS drains with serosanguineous fluid  EXT: No edema, nontender  SKIN: Warm and well perfused, no rashes noted.  NEURO: Alert, oriented, interactive, nonfocal        LABS:  CBC Full  -  ( 15 Nov 2019 18:28 )  WBC Count : 20.03 K/uL  RBC Count : 4.84 M/uL  Hemoglobin : 9.8 g/dL  Hematocrit : 31.8 %  Platelet Count - Automated : 359 K/uL  Mean Cell Volume : 65.7 fl  Mean Cell Hemoglobin : 20.2 pg  Mean Cell Hemoglobin Concentration : 30.8 gm/dL  Auto Neutrophil # : 16.42 K/uL  Auto Lymphocyte # : 2.20 K/uL  Auto Monocyte # : 1.00 K/uL  Auto Eosinophil # : 0.00 K/uL  Auto Basophil # : 0.00 K/uL  Auto Neutrophil % : 76.0 %  Auto Lymphocyte % : 11.0 %  Auto Monocyte % : 5.0 %  Auto Eosinophil % : 0.0 %  Auto Basophil % : 0.0 %    11-15    139  |  106  |  7   ----------------------------<  156<H>  3.6   |  24  |  0.64    Ca    7.4<L>      15 Nov 2019 18:28  Phos  3.6     11-15  Mg     1.3     11-15    TPro  5.6<L>  /  Alb  2.5<L>  /  TBili  0.6  /  DBili  x   /  AST  35  /  ALT  18  /  AlkPhos  52  11-15    PT/INR - ( 15 Nov 2019 18:28 )   PT: 12.3 sec;   INR: 1.10 ratio         PTT - ( 15 Nov 2019 18:28 )  PTT:26.3 sec        RADIOLOGY & ADDITIONAL STUDIES REVIEWED:  ***    GOALS OF CARE DISCUSSION WITH PATIENT/FAMILY/PROXY:    CRITICAL CARE TIME SPENT: 35 minutes

## 2019-11-17 LAB
ALBUMIN SERPL ELPH-MCNC: 2 G/DL — LOW (ref 3.5–5)
ALP SERPL-CCNC: 52 U/L — SIGNIFICANT CHANGE UP (ref 40–120)
ALT FLD-CCNC: 12 U/L DA — SIGNIFICANT CHANGE UP (ref 10–60)
ANION GAP SERPL CALC-SCNC: 7 MMOL/L — SIGNIFICANT CHANGE UP (ref 5–17)
AST SERPL-CCNC: 28 U/L — SIGNIFICANT CHANGE UP (ref 10–40)
BILIRUB SERPL-MCNC: 0.6 MG/DL — SIGNIFICANT CHANGE UP (ref 0.2–1.2)
BUN SERPL-MCNC: 8 MG/DL — SIGNIFICANT CHANGE UP (ref 7–18)
CALCIUM SERPL-MCNC: 8 MG/DL — LOW (ref 8.4–10.5)
CHLORIDE SERPL-SCNC: 107 MMOL/L — SIGNIFICANT CHANGE UP (ref 96–108)
CO2 SERPL-SCNC: 27 MMOL/L — SIGNIFICANT CHANGE UP (ref 22–31)
CREAT SERPL-MCNC: 0.41 MG/DL — LOW (ref 0.5–1.3)
GLUCOSE SERPL-MCNC: 98 MG/DL — SIGNIFICANT CHANGE UP (ref 70–99)
HCT VFR BLD CALC: 30.6 % — LOW (ref 34.5–45)
HGB BLD-MCNC: 9.5 G/DL — LOW (ref 11.5–15.5)
LACTATE SERPL-SCNC: 1 MMOL/L — SIGNIFICANT CHANGE UP (ref 0.7–2)
MAGNESIUM SERPL-MCNC: 2 MG/DL — SIGNIFICANT CHANGE UP (ref 1.6–2.6)
MCHC RBC-ENTMCNC: 21.6 PG — LOW (ref 27–34)
MCHC RBC-ENTMCNC: 31 GM/DL — LOW (ref 32–36)
MCV RBC AUTO: 69.5 FL — LOW (ref 80–100)
NRBC # BLD: 0 /100 WBCS — SIGNIFICANT CHANGE UP (ref 0–0)
PHOSPHATE SERPL-MCNC: 1.7 MG/DL — LOW (ref 2.5–4.5)
PLATELET # BLD AUTO: 244 K/UL — SIGNIFICANT CHANGE UP (ref 150–400)
POTASSIUM SERPL-MCNC: 3.8 MMOL/L — SIGNIFICANT CHANGE UP (ref 3.5–5.3)
POTASSIUM SERPL-SCNC: 3.8 MMOL/L — SIGNIFICANT CHANGE UP (ref 3.5–5.3)
PROT SERPL-MCNC: 5.5 G/DL — LOW (ref 6–8.3)
RBC # BLD: 4.4 M/UL — SIGNIFICANT CHANGE UP (ref 3.8–5.2)
RBC # FLD: 16.4 % — HIGH (ref 10.3–14.5)
SODIUM SERPL-SCNC: 141 MMOL/L — SIGNIFICANT CHANGE UP (ref 135–145)
WBC # BLD: 16.59 K/UL — HIGH (ref 3.8–10.5)
WBC # FLD AUTO: 16.59 K/UL — HIGH (ref 3.8–10.5)

## 2019-11-17 RX ORDER — ACETAMINOPHEN 500 MG
1000 TABLET ORAL ONCE
Refills: 0 | Status: COMPLETED | OUTPATIENT
Start: 2019-11-17 | End: 2019-11-17

## 2019-11-17 RX ORDER — POTASSIUM CHLORIDE 20 MEQ
40 PACKET (EA) ORAL ONCE
Refills: 0 | Status: DISCONTINUED | OUTPATIENT
Start: 2019-11-17 | End: 2019-11-17

## 2019-11-17 RX ORDER — POTASSIUM PHOSPHATE, MONOBASIC POTASSIUM PHOSPHATE, DIBASIC 236; 224 MG/ML; MG/ML
15 INJECTION, SOLUTION INTRAVENOUS ONCE
Refills: 0 | Status: COMPLETED | OUTPATIENT
Start: 2019-11-17 | End: 2019-11-17

## 2019-11-17 RX ORDER — OXYCODONE AND ACETAMINOPHEN 5; 325 MG/1; MG/1
2 TABLET ORAL EVERY 6 HOURS
Refills: 0 | Status: DISCONTINUED | OUTPATIENT
Start: 2019-11-17 | End: 2019-11-18

## 2019-11-17 RX ORDER — POTASSIUM CHLORIDE 20 MEQ
40 PACKET (EA) ORAL ONCE
Refills: 0 | Status: COMPLETED | OUTPATIENT
Start: 2019-11-17 | End: 2019-11-17

## 2019-11-17 RX ORDER — OXYCODONE AND ACETAMINOPHEN 5; 325 MG/1; MG/1
1 TABLET ORAL EVERY 4 HOURS
Refills: 0 | Status: DISCONTINUED | OUTPATIENT
Start: 2019-11-17 | End: 2019-11-18

## 2019-11-17 RX ADMIN — Medication 400 MILLIGRAM(S): at 10:00

## 2019-11-17 RX ADMIN — PANTOPRAZOLE SODIUM 40 MILLIGRAM(S): 20 TABLET, DELAYED RELEASE ORAL at 11:11

## 2019-11-17 RX ADMIN — SODIUM CHLORIDE 3 MILLILITER(S): 9 INJECTION INTRAMUSCULAR; INTRAVENOUS; SUBCUTANEOUS at 14:38

## 2019-11-17 RX ADMIN — Medication 1000 MILLIGRAM(S): at 05:45

## 2019-11-17 RX ADMIN — POTASSIUM PHOSPHATE, MONOBASIC POTASSIUM PHOSPHATE, DIBASIC 62.5 MILLIMOLE(S): 236; 224 INJECTION, SOLUTION INTRAVENOUS at 06:38

## 2019-11-17 RX ADMIN — Medication 400 MILLIGRAM(S): at 05:12

## 2019-11-17 RX ADMIN — Medication 1000 MILLIGRAM(S): at 16:00

## 2019-11-17 RX ADMIN — OXYCODONE AND ACETAMINOPHEN 1 TABLET(S): 5; 325 TABLET ORAL at 21:50

## 2019-11-17 RX ADMIN — SODIUM CHLORIDE 3 MILLILITER(S): 9 INJECTION INTRAMUSCULAR; INTRAVENOUS; SUBCUTANEOUS at 05:12

## 2019-11-17 RX ADMIN — Medication 400 MILLIGRAM(S): at 15:43

## 2019-11-17 RX ADMIN — Medication 1000 MILLIGRAM(S): at 10:00

## 2019-11-17 RX ADMIN — CHLORHEXIDINE GLUCONATE 1 APPLICATION(S): 213 SOLUTION TOPICAL at 11:12

## 2019-11-17 RX ADMIN — Medication 40 MILLIEQUIVALENT(S): at 10:49

## 2019-11-17 RX ADMIN — ENOXAPARIN SODIUM 40 MILLIGRAM(S): 100 INJECTION SUBCUTANEOUS at 11:11

## 2019-11-17 RX ADMIN — OXYCODONE AND ACETAMINOPHEN 1 TABLET(S): 5; 325 TABLET ORAL at 21:12

## 2019-11-17 RX ADMIN — SODIUM CHLORIDE 3 MILLILITER(S): 9 INJECTION INTRAMUSCULAR; INTRAVENOUS; SUBCUTANEOUS at 21:05

## 2019-11-17 NOTE — PROGRESS NOTE ADULT - ASSESSMENT
Plan - POD#2 s/p bilateral breast reconstruction with ROD flaps    Doing well    - D/c vioptix  - Cont flap check q 4hrs  - Percocet PRN  - OOB ambulate  - DVT prophylaxis  - Abd binder and bra at bed side  - Ok to tx to floor  - D/c planning

## 2019-11-17 NOTE — PROGRESS NOTE ADULT - SUBJECTIVE AND OBJECTIVE BOX
INTERVAL HPI/OVERNIGHT EVENTS: ***    PRESSORS: [ ] YES [ ] NO  WHICH:    ANTIBIOTICS:                  DATE STARTED:  ANTIBIOTICS:                  DATE STARTED:  ANTIBIOTICS:                  DATE STARTED:    Antimicrobial:    Cardiovascular:    Pulmonary:    Hematalogic:  enoxaparin Injectable 40 milliGRAM(s) SubCutaneous daily    Other:  chlorhexidine 4% Liquid 1 Application(s) Topical daily  HYDROmorphone  Injectable 1 milliGRAM(s) IV Push every 4 hours PRN  lactated ringers. 1000 milliLiter(s) IV Continuous <Continuous>  pantoprazole  Injectable 40 milliGRAM(s) IV Push daily  sodium chloride 0.9% lock flush 3 milliLiter(s) IV Push every 8 hours    chlorhexidine 4% Liquid 1 Application(s) Topical daily  enoxaparin Injectable 40 milliGRAM(s) SubCutaneous daily  HYDROmorphone  Injectable 1 milliGRAM(s) IV Push every 4 hours PRN  lactated ringers. 1000 milliLiter(s) IV Continuous <Continuous>  pantoprazole  Injectable 40 milliGRAM(s) IV Push daily  sodium chloride 0.9% lock flush 3 milliLiter(s) IV Push every 8 hours    Drug Dosing Weight  Height (cm): 147.3 (15 Nov 2019 17:30)  Weight (kg): 59.9 (15 Nov 2019 17:30)  BMI (kg/m2): 27.6 (15 Nov 2019 17:30)  BSA (m2): 1.53 (15 Nov 2019 17:30)    CENTRAL LINE: [ ] YES [ ] NO  LOCATION:   DATE INSERTED:  REMOVE: [ ] YES [ ] NO  EXPLAIN:    MARIN: [ ] YES [ ] NO    DATE INSERTED:  REMOVE:  [ ] YES [ ] NO  EXPLAIN:    A-LINE:  [ ] YES [ ] NO  LOCATION:   DATE INSERTED:  REMOVE:  [ ] YES [ ] NO  EXPLAIN:    PMH -reviewed admission note, no change since admission  Heart faliure: acute [ ] chronic [ ] acute or chronic [ ] diastolic [ ] systolic [ ] combied systolic and diastolic[ ]  GLORIA: ATN[ ] renal medullary necrosis [ ] CKD I [ ]CKDII [ ]CKD III [ ]CKD IV [ ]CKD V [ ]Other pathological lesions [ ]  Abdominal Nutrition Status: malnutrition [ ] cachexia [ ] morbid obesity/BMI=40 [ ] Supplement ordered [___________]     ICU Vital Signs Last 24 Hrs  T(C): 37.2 (16 Nov 2019 20:00), Max: 38.3 (16 Nov 2019 15:00)  T(F): 99 (16 Nov 2019 20:00), Max: 100.9 (16 Nov 2019 15:00)  HR: 102 (16 Nov 2019 23:00) (96 - 116)  BP: 106/60 (16 Nov 2019 23:00) (98/59 - 119/65)  BP(mean): 70 (16 Nov 2019 23:00) (66 - 79)  ABP: --  ABP(mean): --  RR: 0 (16 Nov 2019 23:00) (0 - 24)  SpO2: 100% (16 Nov 2019 23:00) (96% - 100%)            11-15 @ 07:01  -  11-16 @ 07:00  --------------------------------------------------------  IN: 1525 mL / OUT: 950 mL / NET: 575 mL            >>> <<<    PHYSICAL EXAM:    GENERAL: NAD, well-groomed, well-developed  HEAD:  Atraumatic, Normocephalic  EYES: EOMI, PERRLA, conjunctiva and sclera clear  ENMT: No tonsillar erythema, exudates, or enlargement; Moist mucous membranes, Good dentition, No lesions  NECK: Supple, normal appearance, No JVD; Normal thyroid; Trachea midline  NERVOUS SYSTEM:  Alert & Oriented X3, Good concentration; Motor Strength 5/5 B/L upper and lower extremities; DTRs 2+ intact and symmetric  CHEST/LUNG: No chest deformity; Normal percussion bilaterally; No rales, rhonchi, wheezing   HEART: Regular rate and rhythm; No murmurs, rubs, or gallops  ABDOMEN: Soft, Nontender, Nondistended; Bowel sounds present  EXTREMITIES:  2+ Peripheral Pulses, No clubbing, cyanosis, or edema  LYMPH: No lymphadenopathy noted  SKIN: No rashes or lesions; Good capillary refill      LABS:  CBC Full  -  ( 16 Nov 2019 19:37 )  WBC Count : 16.55 K/uL  RBC Count : 3.91 M/uL  Hemoglobin : 7.9 g/dL  Hematocrit : 25.8 %  Platelet Count - Automated : 290 K/uL  Mean Cell Volume : 66.0 fl  Mean Cell Hemoglobin : 20.2 pg  Mean Cell Hemoglobin Concentration : 30.6 gm/dL  Auto Neutrophil # : 13.27 K/uL  Auto Lymphocyte # : 2.03 K/uL  Auto Monocyte # : 1.12 K/uL  Auto Eosinophil # : 0.01 K/uL  Auto Basophil # : 0.05 K/uL  Auto Neutrophil % : 80.1 %  Auto Lymphocyte % : 12.3 %  Auto Monocyte % : 6.8 %  Auto Eosinophil % : 0.1 %  Auto Basophil % : 0.3 %    11-16    138  |  105  |  7   ----------------------------<  143<H>  3.6   |  27  |  0.60    Ca    7.6<L>      16 Nov 2019 02:36  Phos  2.6     11-16  Mg     1.7     11-16    TPro  5.1<L>  /  Alb  2.1<L>  /  TBili  0.4  /  DBili  x   /  AST  35  /  ALT  15  /  AlkPhos  42  11-16    PT/INR - ( 15 Nov 2019 18:28 )   PT: 12.3 sec;   INR: 1.10 ratio         PTT - ( 15 Nov 2019 18:28 )  PTT:26.3 sec        RADIOLOGY & ADDITIONAL STUDIES REVIEWED:  ***    [ ]GOALS OF CARE DISCUSSION WITH PATIENT/FAMILY/PROXY:    CRITICAL CARE TIME SPENT: 35 minutes INTERVAL HPI/OVERNIGHT EVENTS: Overnight Hb drop and drop on left diopter reading noted, pt placed on O2 inhalation with immediate improvement in readings. 1prbc transfused as per plastic surgery recommendations    PRESSORS: [ ] YES [ x] NO    Antimicrobial:    Cardiovascular:    Pulmonary:    Hematalogic:  enoxaparin Injectable 40 milliGRAM(s) SubCutaneous daily    Other:  chlorhexidine 4% Liquid 1 Application(s) Topical daily  HYDROmorphone  Injectable 1 milliGRAM(s) IV Push every 4 hours PRN  lactated ringers. 1000 milliLiter(s) IV Continuous <Continuous>  pantoprazole  Injectable 40 milliGRAM(s) IV Push daily  sodium chloride 0.9% lock flush 3 milliLiter(s) IV Push every 8 hours    chlorhexidine 4% Liquid 1 Application(s) Topical daily  enoxaparin Injectable 40 milliGRAM(s) SubCutaneous daily  HYDROmorphone  Injectable 1 milliGRAM(s) IV Push every 4 hours PRN  lactated ringers. 1000 milliLiter(s) IV Continuous <Continuous>  pantoprazole  Injectable 40 milliGRAM(s) IV Push daily  sodium chloride 0.9% lock flush 3 milliLiter(s) IV Push every 8 hours    Drug Dosing Weight  Height (cm): 147.3 (15 Nov 2019 17:30)  Weight (kg): 59.9 (15 Nov 2019 17:30)  BMI (kg/m2): 27.6 (15 Nov 2019 17:30)  BSA (m2): 1.53 (15 Nov 2019 17:30)    CENTRAL LINE: [ ] YES [ ] NO  LOCATION:   DATE INSERTED:  REMOVE: [ ] YES [ ] NO  EXPLAIN:    MARIN: [ ] YES [ ] NO    DATE INSERTED:  REMOVE:  [ ] YES [ ] NO  EXPLAIN:    A-LINE:  [ ] YES [ ] NO  LOCATION:   DATE INSERTED:  REMOVE:  [ ] YES [ ] NO  EXPLAIN:    PMH -reviewed admission note, no change since admission  Heart faliure: acute [ ] chronic [ ] acute or chronic [ ] diastolic [ ] systolic [ ] combied systolic and diastolic[ ]  GLORIA: ATN[ ] renal medullary necrosis [ ] CKD I [ ]CKDII [ ]CKD III [ ]CKD IV [ ]CKD V [ ]Other pathological lesions [ ]  Abdominal Nutrition Status: malnutrition [ ] cachexia [ ] morbid obesity/BMI=40 [ ] Supplement ordered [___________]     ICU Vital Signs Last 24 Hrs  T(C): 37.2 (16 Nov 2019 20:00), Max: 38.3 (16 Nov 2019 15:00)  T(F): 99 (16 Nov 2019 20:00), Max: 100.9 (16 Nov 2019 15:00)  HR: 102 (16 Nov 2019 23:00) (96 - 116)  BP: 106/60 (16 Nov 2019 23:00) (98/59 - 119/65)  BP(mean): 70 (16 Nov 2019 23:00) (66 - 79)  ABP: --  ABP(mean): --  RR: 0 (16 Nov 2019 23:00) (0 - 24)  SpO2: 100% (16 Nov 2019 23:00) (96% - 100%)            11-15 @ 07:01  -  11-16 @ 07:00  --------------------------------------------------------  IN: 1525 mL / OUT: 950 mL / NET: 575 mL            General: No acute distress.    HEENT: Pupils equal, reactive to light.  Symmetric.  PULM: Clear to auscultation bilaterally, no significant sputum production  CVS: Regular rate and rhythm, no murmurs, rubs, or gallops  BREASTS: no swelling, sutures intact, no discharge or bleeding noted, 6 FRANCIS drains noted, 2 on each side, and 2 on the abdomen draining serosanguineous fluid  ABD: Soft, nondistended, nontender, normoactive bowel sounds, no masses; surgical site covered with abdominal pd, 2 FRANCIS drains with serosanguineous fluid  EXT: No edema, nontender  SKIN: Warm and well perfused, no rashes noted.  NEURO: Alert, oriented, interactive, nonfocal      LABS:  CBC Full  -  ( 16 Nov 2019 19:37 )  WBC Count : 16.55 K/uL  RBC Count : 3.91 M/uL  Hemoglobin : 7.9 g/dL  Hematocrit : 25.8 %  Platelet Count - Automated : 290 K/uL  Mean Cell Volume : 66.0 fl  Mean Cell Hemoglobin : 20.2 pg  Mean Cell Hemoglobin Concentration : 30.6 gm/dL  Auto Neutrophil # : 13.27 K/uL  Auto Lymphocyte # : 2.03 K/uL  Auto Monocyte # : 1.12 K/uL  Auto Eosinophil # : 0.01 K/uL  Auto Basophil # : 0.05 K/uL  Auto Neutrophil % : 80.1 %  Auto Lymphocyte % : 12.3 %  Auto Monocyte % : 6.8 %  Auto Eosinophil % : 0.1 %  Auto Basophil % : 0.3 %    11-16    138  |  105  |  7   ----------------------------<  143<H>  3.6   |  27  |  0.60    Ca    7.6<L>      16 Nov 2019 02:36  Phos  2.6     11-16  Mg     1.7     11-16    TPro  5.1<L>  /  Alb  2.1<L>  /  TBili  0.4  /  DBili  x   /  AST  35  /  ALT  15  /  AlkPhos  42  11-16    PT/INR - ( 15 Nov 2019 18:28 )   PT: 12.3 sec;   INR: 1.10 ratio         PTT - ( 15 Nov 2019 18:28 )  PTT:26.3 sec        RADIOLOGY & ADDITIONAL STUDIES REVIEWED:  ***    [ ]GOALS OF CARE DISCUSSION WITH PATIENT/FAMILY/PROXY:    CRITICAL CARE TIME SPENT: 35 minutes INTERVAL HPI/OVERNIGHT EVENTS: Overnight Hb drop and drop on left diopter reading noted, pt placed on O2 inhalation with immediate improvement in readings. 1prbc transfused as per plastic surgery recommendation    PRESSORS: [ ] YES [ x] NO    Antimicrobial:    Cardiovascular:    Pulmonary:    Hematalogic:  enoxaparin Injectable 40 milliGRAM(s) SubCutaneous daily    Other:  chlorhexidine 4% Liquid 1 Application(s) Topical daily  HYDROmorphone  Injectable 1 milliGRAM(s) IV Push every 4 hours PRN  lactated ringers. 1000 milliLiter(s) IV Continuous <Continuous>  pantoprazole  Injectable 40 milliGRAM(s) IV Push daily  sodium chloride 0.9% lock flush 3 milliLiter(s) IV Push every 8 hours    chlorhexidine 4% Liquid 1 Application(s) Topical daily  enoxaparin Injectable 40 milliGRAM(s) SubCutaneous daily  HYDROmorphone  Injectable 1 milliGRAM(s) IV Push every 4 hours PRN  lactated ringers. 1000 milliLiter(s) IV Continuous <Continuous>  pantoprazole  Injectable 40 milliGRAM(s) IV Push daily  sodium chloride 0.9% lock flush 3 milliLiter(s) IV Push every 8 hours    Drug Dosing Weight  Height (cm): 147.3 (15 Nov 2019 17:30)  Weight (kg): 59.9 (15 Nov 2019 17:30)  BMI (kg/m2): 27.6 (15 Nov 2019 17:30)  BSA (m2): 1.53 (15 Nov 2019 17:30)    CENTRAL LINE: [ ] YES [ x ] NO  LOCATION:   DATE INSERTED:  REMOVE: [ ] YES [ ] NO  EXPLAIN:    MARIN: [ x ] YES [  ] NO    DATE INSERTED:  REMOVE:  [ ] YES [ ] NO  EXPLAIN:    A-LINE:  [ ] YES [ x ] NO  LOCATION:   DATE INSERTED:  REMOVE:  [ ] YES [ ] NO  EXPLAIN:    PMH -reviewed admission note, no change since admission  Heart faliure: acute [ ] chronic [ ] acute or chronic [ ] diastolic [ ] systolic [ ] combied systolic and diastolic[ ]  GLORIA: ATN[ ] renal medullary necrosis [ ] CKD I [ ]CKDII [ ]CKD III [ ]CKD IV [ ]CKD V [ ]Other pathological lesions [ ]  Abdominal Nutrition Status: malnutrition [ ] cachexia [ ] morbid obesity/BMI=40 [ ] Supplement ordered [___________]     ICU Vital Signs Last 24 Hrs  T(C): 37.2 (16 Nov 2019 20:00), Max: 38.3 (16 Nov 2019 15:00)  T(F): 99 (16 Nov 2019 20:00), Max: 100.9 (16 Nov 2019 15:00)  HR: 102 (16 Nov 2019 23:00) (96 - 116)  BP: 106/60 (16 Nov 2019 23:00) (98/59 - 119/65)  BP(mean): 70 (16 Nov 2019 23:00) (66 - 79)  ABP: --  ABP(mean): --  RR: 0 (16 Nov 2019 23:00) (0 - 24)  SpO2: 100% (16 Nov 2019 23:00) (96% - 100%)            11-15 @ 07:01  -  11-16 @ 07:00  --------------------------------------------------------  IN: 1525 mL / OUT: 950 mL / NET: 575 mL            General: No acute distress.    HEENT: Pupils equal, reactive to light.  Symmetric.  PULM: Clear to auscultation bilaterally, no significant sputum production  CVS: Regular rate and rhythm, no murmurs, rubs, or gallops  BREASTS: no swelling, sutures intact, no discharge or bleeding noted,   6 FRANCIS drains noted, 2 on each side, and 2 on the abdomen draining minimal serosanguineous fluid  ABD: Soft, nondistended, nontender, normoactive bowel sounds, no masses; surgical site covered with abdominal pd, 2 FRANCIS drains with serosanguineous fluid  EXT: No edema, nontender  SKIN: Warm and well perfused, no rashes noted.  NEURO: Alert, oriented, interactive, nonfocal      LABS:  CBC Full  -  ( 16 Nov 2019 19:37 )  WBC Count : 16.55 K/uL  RBC Count : 3.91 M/uL  Hemoglobin : 7.9 g/dL  Hematocrit : 25.8 %  Platelet Count - Automated : 290 K/uL  Mean Cell Volume : 66.0 fl  Mean Cell Hemoglobin : 20.2 pg  Mean Cell Hemoglobin Concentration : 30.6 gm/dL  Auto Neutrophil # : 13.27 K/uL  Auto Lymphocyte # : 2.03 K/uL  Auto Monocyte # : 1.12 K/uL  Auto Eosinophil # : 0.01 K/uL  Auto Basophil # : 0.05 K/uL  Auto Neutrophil % : 80.1 %  Auto Lymphocyte % : 12.3 %  Auto Monocyte % : 6.8 %  Auto Eosinophil % : 0.1 %  Auto Basophil % : 0.3 %    11-16    138  |  105  |  7   ----------------------------<  143<H>  3.6   |  27  |  0.60    Ca    7.6<L>      16 Nov 2019 02:36  Phos  2.6     11-16  Mg     1.7     11-16    TPro  5.1<L>  /  Alb  2.1<L>  /  TBili  0.4  /  DBili  x   /  AST  35  /  ALT  15  /  AlkPhos  42  11-16    PT/INR - ( 15 Nov 2019 18:28 )   PT: 12.3 sec;   INR: 1.10 ratio         PTT - ( 15 Nov 2019 18:28 )  PTT:26.3 sec    CRITICAL CARE TIME SPENT: 35 minutes

## 2019-11-17 NOTE — PROGRESS NOTE ADULT - SUBJECTIVE AND OBJECTIVE BOX
Pt received 1 unit PRBC overnight, with appropriate response  No other events    ICU Vital Signs Last 24 Hrs  T(C): 37.1 (17 Nov 2019 08:00), Max: 38.3 (16 Nov 2019 15:00)  T(F): 98.8 (17 Nov 2019 08:00), Max: 100.9 (16 Nov 2019 15:00)  HR: 106 (17 Nov 2019 10:00) (83 - 115)  BP: 110/73 (17 Nov 2019 10:00) (96/50 - 119/62)  BP(mean): 79 (17 Nov 2019 10:00) (61 - 79)  ABP: --  ABP(mean): --  RR: 20 (17 Nov 2019 10:00) (0 - 24)  SpO2: 97% (17 Nov 2019 10:00) (96% - 100%)                            9.5    16.59 )-----------( 244      ( 17 Nov 2019 05:11 )             30.6     Breast - flaps are warm, soft, no congestion, Vioptix 50, 59%, + doppler signals, martine serosang  Abd - incision cdi, martine serosang

## 2019-11-17 NOTE — PROGRESS NOTE ADULT - ASSESSMENT
_________________________________________________________________________________________  ========>>  M E D I C A L   A T T E N D I N G    F O L L O W  U P  N O T E  <<=========  -----------------------------------------------------------------------------------------------------    - Patient seen and examined by me earlier today.   (covering today)   - Patient today overall doing ok, comfortable, eating fairly, pain overall controlled / stable     ==================>> REVIEW OF SYSTEM <<=================    GEN: no fever, no chills, + some pain in left breast and arm   RESP: no SOB, no cough, no sputum  CVS: no chest pain, no palpitations, no edema  GI: no abdominal pain, no nausea  : no dysuria, no frequency reported   Neuro: no headache, no dizziness  Derm : no itching, no rash    ==================>> PHYSICAL EXAM <<=================    GEN: A&O X 3 , NAD , comfortable in chair   HEENT: NCAT, PERRL, MMM, hearing intact  Neck: supple , no JVD  CVS: S1S2 , regular , No M/R/G appreciated  PULM: CTA B/L,  no W/R/R appreciated  ABD.: soft. non tender, non distended,  bowel sounds present  Extrem: intact pulses , no edema     wounds dressed    + bilateral arms edema, mild   PSYCH : normal mood,  not anxious      ==================>> MEDICATIONS <<====================    MEDICATIONS  (STANDING):  chlorhexidine 4% Liquid 1 Application(s) Topical daily  enoxaparin Injectable 40 milliGRAM(s) SubCutaneous daily  pantoprazole  Injectable 40 milliGRAM(s) IV Push daily  sodium chloride 0.9% lock flush 3 milliLiter(s) IV Push every 8 hours    MEDICATIONS  (PRN):  HYDROmorphone  Injectable 1 milliGRAM(s) IV Push every 4 hours PRN Severe Pain (7 - 10)    ==================>> VITAL SIGNS <<==================    T(C): 37.1 (11-17-19 @ 08:00), Max: 38.3 (11-16-19 @ 15:00)  HR: 106 (11-17-19 @ 10:00) (83 - 115)  BP: 110/73 (11-17-19 @ 10:00) (96/50 - 118/66)  BP(mean): 79 (11-17-19 @ 10:00)  RR: 20 (11-17-19 @ 10:00) (0 - 24)  SpO2: 97% (11-17-19 @ 10:00) (96% - 100%)     I&O's Summary    16 Nov 2019 07:01  -  17 Nov 2019 07:00  --------------------------------------------------------  IN: 2250 mL / OUT: 3040 mL / NET: -790 mL     ==================>> LAB AND IMAGING <<==================                        9.5    16.59 )-----------( 244      ( 17 Nov 2019 05:11 )             30.6        WBC count:   16.59 <<== ,  16.55 <<== ,  16.90 <<== ,  14.57 <<== ,  20.03 <<==   Hemoglobin:   9.5 <<==,  7.9 <<==,  9.1 <<==,  8.4 <<==,  9.8 <<==    141  |  107  |  8   ----------------------------<  98  3.8   |  27  |  0.41<L>    Ca    8.0<L>      17 Nov 2019 05:11  Phos  1.7     11-17  Mg     2.0     11-17    TPro  5.5<L>  /  Alb  2.0<L>  /  TBili  0.6  /  DBili  x   /  AST  28  /  ALT  12  /  AlkPhos  52  11-17    PT/INR - ( 15 Nov 2019 18:28 )   PT: 12.3 sec;   INR: 1.10 ratio    PTT - ( 15 Nov 2019 18:28 )  PTT:26.3 sec               ___________________________________________________________________________________  ===============>>  A S S E S S M E N T   A N D   P L A N <<===============  ------------------------------------------------------------------------------------------      Assessment:  -post Bilateral Mastectomy and Breast reconstruction with flaps  - anemia  - edema post op   - leukocytosis post OP     PLan:  pt overall doing well  appreciated ICU and plastics follow up and mgmt  pain mgmt  PRBC as needed  local wound care  monitor WBC  PT / OOB  elevate arms     ? Upper Extrem  compression stockings ?   -GI/DVT Prophylaxis.    --------------------------------------------  Case discussed with pt and family, RN  ___________________________  ESTEFANI Amos D.O.  (covering today)   Pager: [phone]

## 2019-11-17 NOTE — PROGRESS NOTE ADULT - SUBJECTIVE AND OBJECTIVE BOX
INTERVAL HPI/OVERNIGHT EVENTS:  s/p i unit PRBC transfusion over night       Antimicrobial:    Cardiovascular:    Pulmonary:    Hematalogic:  enoxaparin Injectable 40 milliGRAM(s) SubCutaneous daily    Other:  chlorhexidine 4% Liquid 1 Application(s) Topical daily  HYDROmorphone  Injectable 1 milliGRAM(s) IV Push every 4 hours PRN  pantoprazole  Injectable 40 milliGRAM(s) IV Push daily  sodium chloride 0.9% lock flush 3 milliLiter(s) IV Push every 8 hours      Drug Dosing Weight  Height (cm): 147.3 (15 Nov 2019 17:30)  Weight (kg): 59.9 (15 Nov 2019 17:30)  BMI (kg/m2): 27.6 (15 Nov 2019 17:30)  BSA (m2): 1.53 (15 Nov 2019 17:30)    CENTRAL LINE: [ ] YES [ ] NO  LOCATION:   DATE INSERTED:    MARIN: [ ] YES [ ] NO    DATE INSERTED:    A-LINE:  [ ] YES [ ] NO  LOCATION:   DATE INSERTED:    PMH/Social Hx/Fam Hx -reviewed admission note, no change since admission  PAST MEDICAL & SURGICAL HISTORY:  Malignant neoplasm of unspecified site of unspecified female breast  S/P dilation and curettage  S/P bilateral breast biopsy  H/O  section: x 1      T(C): 36.7 (19 @ 11:00), Max: 38.3 (19 @ 15:00)  HR: 106 (19 @ 11:00)  BP: 119/75 (19 @ 11:00)  BP(mean): 85 (19 @ 11:00)  ABP: --  ABP(mean): --  RR: 19 (19 @ 11:00)  SpO2: 98% (19 @ 11:00)  Wt(kg): --           @ 07:01  -   @ 07:00  --------------------------------------------------------  IN: 2250 mL / OUT: 3240 mL / NET: -990 mL            PHYSICAL EXAM:    GENERAL: No signs of distress, comfortable  HEAD: Atraumatic, Normocephalic  EYES: EOMI, PERRLA  ENMT: No erythema, exudates, or enlargement, Moist mucous membranes  NECK: Supple, normal appearance, No JVD; [  ] central line (if applicable)  CHEST/LUNG: No chest deformity, fair bilateral air entry; No rales, rhonchi, wheezing; crackles  BREAST: b/l reconstruction with surgical dressing   HEART: Regular rate and rhythm; No murmurs, rubs, or gallops;   ABDOMEN: Soft, Nontender, Nondistended; Bowel sounds present  EXTREMITIES:  + Peripheral Pulses, No clubbing, cyanosis, or edema  NERVOUS SYSTEM: awake and alert x 3, follows commands, upper and lower extremities  LYMPH: No lymphadenopathy noted  SKIN: No rashes or lesions; good turgor, warm, dry      LABS:  CBC Full  -  ( 2019 05:11 )  WBC Count : 16.59 K/uL  RBC Count : 4.40 M/uL  Hemoglobin : 9.5 g/dL  Hematocrit : 30.6 %  Platelet Count - Automated : 244 K/uL  Mean Cell Volume : 69.5 fl  Mean Cell Hemoglobin : 21.6 pg  Mean Cell Hemoglobin Concentration : 31.0 gm/dL  Auto Neutrophil # : x  Auto Lymphocyte # : x  Auto Monocyte # : x  Auto Eosinophil # : x  Auto Basophil # : x  Auto Neutrophil % : x  Auto Lymphocyte % : x  Auto Monocyte % : x  Auto Eosinophil % : x  Auto Basophil % : x        141  |  107  |  8   ----------------------------<  98  3.8   |  27  |  0.41<L>    Ca    8.0<L>      2019 05:11  Phos  1.7       Mg     2.0         TPro  5.5<L>  /  Alb  2.0<L>  /  TBili  0.6  /  DBili  x   /  AST  28  /  ALT  12  /  AlkPhos  52      PT/INR - ( 15 Nov 2019 18:28 )   PT: 12.3 sec;   INR: 1.10 ratio         PTT - ( 15 Nov 2019 18:28 )  PTT:26.3 sec        RADIOLOGY & ADDITIONAL STUDIES REVIEWED         IMPRESSION:  PAST MEDICAL & SURGICAL HISTORY:  Malignant neoplasm of unspecified site of unspecified female breast  S/P dilation and curettage  S/P bilateral breast biopsy  H/O  section: x 1   p/w         Patient is a 45yo female with no significant PMH, PSH of  and D&C, presented to surgery for breast surgery. Patient reports finding a left sided breast mass in 2019, underwent mammography and biopsy in 2019 and was diagnosed with breast cancer.     Patient underwent right sided simple mastectomy, left modified mastectomy with sentinel lymph node biopsy and breast reconstruction with ROD (abdominal based flap) with abdominal mesh placement. She had an estimated blood loss of 200cc, was given 6l fluids intra-op with urine output of 6l. Patient reports feeling nauseous and slightly sleepy from residual effect of anesthesia, otherwise denies pain or any other symptoms.    Discussed with surgeon, Dr Curtis, post op who recommends to keep patient npo, and monitor Bioptics saturation: currently on 52% on right side and 49% on left side.     Pt admitted to the ICU for Post op/ frequent bioptics monitoring     Assessment:  -Bilateral Mastectomy Breast reconstruction w/ ROD  -Monitor Bioptics saturation- if a drop 10% or greater contact Dr. Curtis and Surgical house officer   -Currently sat is good R is 57% and Left is 53%    Plan:  Neuro:  -Extubated safely- currently at baseline mental status  -Pain control with Dilaudid IV , and IV tylenol    CV:  Off IV fluids  Monitor blood pressure  -Tele monitoring for now   -avoid use of pressors and contact Kirsten Boone if pressure is dropping     Pulm:  -Currently pt saturating 100% on nasal canula  -keep on oxygen as per plastic surgery recommendations      ID:  -Post op Lactate of 2.8->0.9      Nephro:  Monitor bmp and electrolytes    GI:  -diet advanced to regular-   - no Caffeine,     Heme:  -no indication for transfusion   - continue to monitor h/h 9.8->8.4-9.1  - WBC 20->14->16 -s/p 3 doses od ancef     Endo:  -target CBG < 180    Prophy:  -Lovenox for DVT prophy     Dispo:  Possible DG to surgery today    GOC   FULL CODE          GOALS OF CARE DISCUSSION WITH PATIENT/FAMILY/PROXY/ icu team on rounds / spouse    CRITICAL CARE TIME SPENT: 35 minutes

## 2019-11-17 NOTE — PROGRESS NOTE ADULT - ASSESSMENT
Patient is a 45yo female with no significant PMH, PSH of  and D&C, presented to surgery for breast surgery. Patient reports finding a left sided breast mass in 2019, underwent mammography and biopsy in 2019 and was diagnosed with breast cancer.     Patient underwent right sided simple mastectomy, left modified mastectomy with sentinel lymph node biopsy and breast reconstruction with ROD (abdominal based flap) with abdominal mesh placement. She had an estimated blood loss of 200cc, was given 6l fluids intra-op with urine output of 6l. Patient reports feeling nauseous and slightly sleepy from residual effect of anesthesia, otherwise denies pain or any other symptoms.    Discussed with surgeon, Dr Curtis, post op who recommends to keep patient npo, and monitor Bioptics saturation: currently on 52% on right side and 49% on left side.     Pt admitted to the ICU for Post op/ frequent bioptics monitoring     Assessment:  -Bilateral Mastectomy Breast reconstruction w/ ROD  -Monitor Bioptics saturation- if a drop 10% or greater contact Dr. Curtis and Surgical house officer   -Currently sat is good R is 57% and Left is 53%    Plan:  Neuro:  -Extubated safely- currently at baseline mental status  -Pain control with Dilaudid IV , and IV tylenol    CV:  -Post op hypovolemia- s/p 6L NS intra op  -1LR bolus overnight  -c/w LR @ 100 ml/hr  -monitor urine output with hess catheter   -Tele monitoring for now   -avoid use of pressors and contact Kirsten Boone if pressure is dropping     Pulm:  -Currently pt saturating 99% on room air   -No pulmonary concern at this time       ID:  -Post op Lactate of 2.8  -Lactate trended up to 3.3, 1 LR bolus given .->2.3    Nephro:  -c/w IV hydration and monitor UOP  -f/u BMP daily     GI:  -diet advanced to regular-   - no Caffeine,     Heme:  -no indication for transfusion   - continue to monitor h/h 9.8->8.4-9.1  - WBC 20->14->16 -s/p 3 doses od ancef     Endo:  -target CBG < 180    Prophy:  -Lovenox for DVT prophy     Dispo:  - monitor in the ICU at this time.  If pt remains stable- will likely be downgraded to surgical service tomorrow   - OOB tomorrow    [facility]   FULL CODE Patient is a 45yo female with no significant PMH, PSH of  and D&C, presented to surgery for breast surgery. Patient reports finding a left sided breast mass in 2019, underwent mammography and biopsy in 2019 and was diagnosed with breast cancer.     Patient underwent right sided simple mastectomy, left modified mastectomy with sentinel lymph node biopsy and breast reconstruction with ROD (abdominal based flap) with abdominal mesh placement. She had an estimated blood loss of 200cc, was given 6l fluids intra-op with urine output of 6l. Patient reports feeling nauseous and slightly sleepy from residual effect of anesthesia, otherwise denies pain or any other symptoms.    Discussed with surgeon, Dr Curtis, post op who recommends to keep patient npo, and monitor Bioptics saturation: currently on 52% on right side and 49% on left side.     Pt admitted to the ICU for Post op/ frequent bioptics monitoring     Assessment:  -Bilateral Mastectomy Breast reconstruction w/ ROD  -Monitor Bioptics saturation- if a drop 10% or greater contact Dr. Curtis and Surgical house officer   -Currently sat is good R is 57% and Left is 53%    Plan:  Neuro:  -Extubated safely- currently at baseline mental status  -Pain control with Dilaudid IV , and IV tylenol    CV:  Off IV fluids  Monitor blood pressure  -Tele monitoring for now   -avoid use of pressors and contact Kirsten Boone if pressure is dropping     Pulm:  -Currently pt saturating 100% on nasal canula  -keep on oxygen as per plastic surgery recommendations      ID:  -Post op Lactate of 2.8->0.9      Nephro:  Monitor bmp and electrolytes    GI:  -diet advanced to regular-   - no Caffeine,     Heme:  -no indication for transfusion   - continue to monitor h/h 9.8->8.4-9.1  - WBC 20->14->16 -s/p 3 doses od ancef     Endo:  -target CBG < 180    Prophy:  -Lovenox for DVT prophy     Dispo:  Possible DG to surgery today    [facility]   FULL CODE Patient is a 43yo female with no significant PMH, PSH of  and D&C, presented to surgery for breast surgery. Patient underwent right sided simple mastectomy, left modified mastectomy with sentinel lymph node biopsy and breast reconstruction with ROD (abdominal based flap) with abdominal mesh placement, admitted to the ICU for Post op/ frequent vioptix monitoring     Assessment:  -Bilateral Mastectomy Breast reconstruction w/ ROD  -D/c vioptix as per plastic surgery    Plan:  Neuro:  -Extubated safely- currently at baseline mental status  -Pain control with Dilaudid IV , and IV tylenol    CV:  -Off IV fluids  -Monitor blood pressure    Pulm:  -Currently pt saturating 100% on nasal canula  -keep on oxygen as per plastic surgery recommendations      ID:  -Post op Lactate of 2.8->0.9      Nephro:  Monitor bmp and electrolytes    GI:  -diet advanced to regular  - no Caffeine,     Heme:  - s/o 1u prbc for periop blood loss: hgb 9.5 this AM  - WBC 20->14->16 -s/p 3 doses of ancef     Endo:  -target CBG < 180    Prophy:  -Lovenox for DVT prophy     Dispo:  -DG to surgery today    GO   FULL CODE

## 2019-11-17 NOTE — CHART NOTE - NSCHARTNOTEFT_GEN_A_CORE
Patient is a 43yo female with no significant PMH, PSH of  and D&C, presented to surgery for breast surgery. Patient underwent right sided simple mastectomy, left modified mastectomy with sentinel lymph node biopsy and breast reconstruction with ROD (abdominal based flap) with abdominal mesh placement. She had an estimated blood loss of 200cc, was given 6l fluids intra-op with urine output of 6l. Pt admitted to the ICU for Post op/ frequent vioptix monitoring.   ICU: patient's h/h    Assessment:  -Bilateral Mastectomy Breast reconstruction w/ ROD  -Monitor Bioptics saturation- if a drop 10% or greater contact Dr. Curtis and Surgical house officer   -Currently sat is good R is 57% and Left is 53%    Plan:  Neuro:  -Extubated safely- currently at baseline mental status  -Pain control with Dilaudid IV , and IV tylenol    CV:  -Post op hypovolemia- s/p 6L NS intra op  -1LR bolus overnight  -c/w LR @ 100 ml/hr  -monitor urine output with hess catheter   -Tele monitoring for now   -avoid use of pressors and contact Kirsten Boone if pressure is dropping     Pulm:  -Currently pt saturating 99% on room air   -No pulmonary concern at this time       ID:  -Post op Lactate of 2.8  -Lactate trended up to 3.3, 1 LR bolus given .->2.3    Nephro:  -c/w IV hydration and monitor UOP  -f/u BMP daily     GI:  -diet advanced to regular-   - no Caffeine,     Heme:  -no indication for transfusion   - continue to monitor h/h 9.8->8.4-9.1  - WBC 20->14->16 -s/p 3 doses od ancef     Endo:  -target CBG < 180    Prophy: Patient is a 43yo female with no significant PMH, PSH of  and D&C, presented to surgery for breast surgery. Patient underwent right sided simple mastectomy, left modified mastectomy with sentinel lymph node biopsy and breast reconstruction with ROD (abdominal based flap) with abdominal mesh placement. She had an estimated blood loss of 200cc, was given 6l fluids intra-op with urine output of 6l. Pt admitted to the ICU for Post op/ frequent vioptix monitoring.   ICU: Patient's post op labs were significant for lactate of 3.3, was given LR bolus and maintained on iv fluids. Her h/h was noted to trend down to 7.9 likely from periop blood loss, was given 1u prbc overnight with good response. She was also noted to have drop in saturation on vioptix, responded well to 2l oxygen.  Patient stable for downgrade to surgical floor, vioptix  discontinued as per Dr Curtis's recommendation.    Things to do:   1) TOV at 7pm  2) C/w flap checks q4hrs  3) Monitor H/H QD

## 2019-11-18 ENCOUNTER — TRANSCRIPTION ENCOUNTER (OUTPATIENT)
Age: 44
End: 2019-11-18

## 2019-11-18 VITALS
SYSTOLIC BLOOD PRESSURE: 105 MMHG | HEART RATE: 89 BPM | RESPIRATION RATE: 16 BRPM | TEMPERATURE: 98 F | OXYGEN SATURATION: 100 % | DIASTOLIC BLOOD PRESSURE: 62 MMHG

## 2019-11-18 LAB
ANION GAP SERPL CALC-SCNC: 5 MMOL/L — SIGNIFICANT CHANGE UP (ref 5–17)
BUN SERPL-MCNC: 6 MG/DL — LOW (ref 7–18)
CALCIUM SERPL-MCNC: 8.5 MG/DL — SIGNIFICANT CHANGE UP (ref 8.4–10.5)
CHLORIDE SERPL-SCNC: 108 MMOL/L — SIGNIFICANT CHANGE UP (ref 96–108)
CO2 SERPL-SCNC: 28 MMOL/L — SIGNIFICANT CHANGE UP (ref 22–31)
CREAT SERPL-MCNC: 0.41 MG/DL — LOW (ref 0.5–1.3)
GLUCOSE SERPL-MCNC: 91 MG/DL — SIGNIFICANT CHANGE UP (ref 70–99)
HCT VFR BLD CALC: 28 % — LOW (ref 34.5–45)
HGB BLD-MCNC: 8.8 G/DL — LOW (ref 11.5–15.5)
MAGNESIUM SERPL-MCNC: 2 MG/DL — SIGNIFICANT CHANGE UP (ref 1.6–2.6)
MCHC RBC-ENTMCNC: 21.7 PG — LOW (ref 27–34)
MCHC RBC-ENTMCNC: 31.4 GM/DL — LOW (ref 32–36)
MCV RBC AUTO: 69.1 FL — LOW (ref 80–100)
NRBC # BLD: 0 /100 WBCS — SIGNIFICANT CHANGE UP (ref 0–0)
PHOSPHATE SERPL-MCNC: 2 MG/DL — LOW (ref 2.5–4.5)
PLATELET # BLD AUTO: 271 K/UL — SIGNIFICANT CHANGE UP (ref 150–400)
POTASSIUM SERPL-MCNC: 3.6 MMOL/L — SIGNIFICANT CHANGE UP (ref 3.5–5.3)
POTASSIUM SERPL-SCNC: 3.6 MMOL/L — SIGNIFICANT CHANGE UP (ref 3.5–5.3)
RBC # BLD: 4.05 M/UL — SIGNIFICANT CHANGE UP (ref 3.8–5.2)
RBC # FLD: 15.8 % — HIGH (ref 10.3–14.5)
SODIUM SERPL-SCNC: 141 MMOL/L — SIGNIFICANT CHANGE UP (ref 135–145)
WBC # BLD: 12.79 K/UL — HIGH (ref 3.8–10.5)
WBC # FLD AUTO: 12.79 K/UL — HIGH (ref 3.8–10.5)

## 2019-11-18 PROCEDURE — 80048 BASIC METABOLIC PNL TOTAL CA: CPT

## 2019-11-18 PROCEDURE — 84100 ASSAY OF PHOSPHORUS: CPT

## 2019-11-18 PROCEDURE — 36415 COLL VENOUS BLD VENIPUNCTURE: CPT

## 2019-11-18 PROCEDURE — 86850 RBC ANTIBODY SCREEN: CPT

## 2019-11-18 PROCEDURE — 83735 ASSAY OF MAGNESIUM: CPT

## 2019-11-18 PROCEDURE — 88309 TISSUE EXAM BY PATHOLOGIST: CPT

## 2019-11-18 PROCEDURE — C1889: CPT

## 2019-11-18 PROCEDURE — 88305 TISSUE EXAM BY PATHOLOGIST: CPT

## 2019-11-18 PROCEDURE — 86900 BLOOD TYPING SEROLOGIC ABO: CPT

## 2019-11-18 PROCEDURE — C1781: CPT

## 2019-11-18 PROCEDURE — 78195 LYMPH SYSTEM IMAGING: CPT

## 2019-11-18 PROCEDURE — A9541: CPT

## 2019-11-18 PROCEDURE — 88333 PATH CONSLTJ SURG CYTO XM 1: CPT

## 2019-11-18 PROCEDURE — 85027 COMPLETE CBC AUTOMATED: CPT

## 2019-11-18 PROCEDURE — 85730 THROMBOPLASTIN TIME PARTIAL: CPT

## 2019-11-18 PROCEDURE — 85610 PROTHROMBIN TIME: CPT

## 2019-11-18 PROCEDURE — 81025 URINE PREGNANCY TEST: CPT

## 2019-11-18 PROCEDURE — 80053 COMPREHEN METABOLIC PANEL: CPT

## 2019-11-18 PROCEDURE — 86901 BLOOD TYPING SEROLOGIC RH(D): CPT

## 2019-11-18 PROCEDURE — P9040: CPT

## 2019-11-18 PROCEDURE — 86923 COMPATIBILITY TEST ELECTRIC: CPT

## 2019-11-18 PROCEDURE — 88331 PATH CONSLTJ SURG 1 BLK 1SPC: CPT

## 2019-11-18 PROCEDURE — 88341 IMHCHEM/IMCYTCHM EA ADD ANTB: CPT

## 2019-11-18 PROCEDURE — 83605 ASSAY OF LACTIC ACID: CPT

## 2019-11-18 PROCEDURE — 36430 TRANSFUSION BLD/BLD COMPNT: CPT

## 2019-11-18 PROCEDURE — 88342 IMHCHEM/IMCYTCHM 1ST ANTB: CPT

## 2019-11-18 PROCEDURE — 88307 TISSUE EXAM BY PATHOLOGIST: CPT

## 2019-11-18 RX ORDER — CEPHALEXIN 500 MG
1 CAPSULE ORAL
Qty: 10 | Refills: 0
Start: 2019-11-18 | End: 2019-11-22

## 2019-11-18 RX ADMIN — OXYCODONE AND ACETAMINOPHEN 1 TABLET(S): 5; 325 TABLET ORAL at 02:22

## 2019-11-18 RX ADMIN — PANTOPRAZOLE SODIUM 40 MILLIGRAM(S): 20 TABLET, DELAYED RELEASE ORAL at 11:18

## 2019-11-18 RX ADMIN — SODIUM CHLORIDE 3 MILLILITER(S): 9 INJECTION INTRAMUSCULAR; INTRAVENOUS; SUBCUTANEOUS at 05:22

## 2019-11-18 RX ADMIN — OXYCODONE AND ACETAMINOPHEN 1 TABLET(S): 5; 325 TABLET ORAL at 08:30

## 2019-11-18 RX ADMIN — OXYCODONE AND ACETAMINOPHEN 1 TABLET(S): 5; 325 TABLET ORAL at 07:53

## 2019-11-18 RX ADMIN — OXYCODONE AND ACETAMINOPHEN 1 TABLET(S): 5; 325 TABLET ORAL at 03:51

## 2019-11-18 RX ADMIN — ENOXAPARIN SODIUM 40 MILLIGRAM(S): 100 INJECTION SUBCUTANEOUS at 11:18

## 2019-11-18 NOTE — PROGRESS NOTE ADULT - ASSESSMENT
seen nd examined  on edge of bed very comfortable  not in any distress no cp or sob or palp  pain on surgical site is better.  no cp or sob or palp  wbc is going down  no fever, cough, dysurea.  anemia  as per she has it from long  denies UGI symptoms  denies any rectal bleed, as per her she has heavy menses   need to see gyn  f/h negative for ca breast  anemia profile   out pt pcp wit cbc , bmp  iron, b12, folate  f/u surgeon

## 2019-11-18 NOTE — DISCHARGE NOTE NURSING/CASE MANAGEMENT/SOCIAL WORK - PATIENT PORTAL LINK FT
You can access the FollowMyHealth Patient Portal offered by [facility] by registering at the following website: [email or web address]. By joining [facility]’s FollowMyHealth portal, you will also be able to view your health information using other applications (apps) compatible with our system.

## 2019-11-18 NOTE — PROGRESS NOTE ADULT - SUBJECTIVE AND OBJECTIVE BOX
HPI:  45 yo female with no significant PMHx reports the above. Patient denies pain, discharge in her breast.  She is scheduled for Left Modified Mastectomy and Right Simple Mastectomy with Anthony Lymph node Biopsy  Bilateral Breast Reconstruction with Dep Flap Possible Mesh, on 11/15/19 (08 Nov 2019 17:40)      Patient is a 44y old  Female who presents with a chief complaint of b/l breast reconstruction (17 Nov 2019 11:55)      INTERVAL HPI/OVERNIGHT EVENTS:  T(C): 36.8 (11-18-19 @ 12:00), Max: 37.1 (11-18-19 @ 05:22)  HR: 89 (11-18-19 @ 12:00) (89 - 98)  BP: 105/62 (11-18-19 @ 12:00) (105/62 - 111/70)  RR: 16 (11-18-19 @ 12:00) (16 - 16)  SpO2: 100% (11-18-19 @ 12:00) (99% - 100%)  Wt(kg): --  I&O's Summary    17 Nov 2019 07:01  -  18 Nov 2019 07:00  --------------------------------------------------------  IN: 840 mL / OUT: 749 mL / NET: 91 mL        REVIEW OF SYSTEMS: denies fever, chills, SOB, palpitations, chest pain, abdominal pain, nausea, vomitting, diarrhea, constipation, dizziness    MEDICATIONS  (STANDING):  chlorhexidine 4% Liquid 1 Application(s) Topical daily  enoxaparin Injectable 40 milliGRAM(s) SubCutaneous daily  pantoprazole  Injectable 40 milliGRAM(s) IV Push daily  sodium chloride 0.9% lock flush 3 milliLiter(s) IV Push every 8 hours    MEDICATIONS  (PRN):  oxycodone    5 mG/acetaminophen 325 mG 1 Tablet(s) Oral every 4 hours PRN Moderate Pain (4 - 6)  oxycodone    5 mG/acetaminophen 325 mG 2 Tablet(s) Oral every 6 hours PRN Severe Pain (7 - 10)      PHYSICAL EXAM:  GENERAL: NAD, well-groomed, well-developed  HEAD:  Atraumatic, Normocephalic  EYES: EOMI, PERRLA, conjunctiva and sclera clear  ENMT: No tonsillar erythema, exudates, or enlargement; Moist mucous membranes, Good dentition, No lesions  NECK: Supple, No JVD, Normal thyroid  NERVOUS SYSTEM:  Alert & Oriented X3, Good concentration; Motor Strength 5/5 B/L upper and lower extremities; DTRs 2+ intact and symmetric  CHEST/LUNG: Clear to percussion bilaterally; No rales, rhonchi, wheezing, or rubs  HEART: Regular rate and rhythm; No murmurs, rubs, or gallops  ABDOMEN: Soft, Nontender, Nondistended; Bowel sounds present  EXTREMITIES:  2+ Peripheral Pulses, No clubbing, cyanosis, or edema  LYMPH: No lymphadenopathy noted  SKIN: No rashes or lesions  LABS:                        8.8    12.79 )-----------( 271      ( 18 Nov 2019 07:09 )             28.0     11-18    141  |  108  |  6<L>  ----------------------------<  91  3.6   |  28  |  0.41<L>    Ca    8.5      18 Nov 2019 07:09  Phos  2.0     11-18  Mg     2.0     11-18    TPro  5.5<L>  /  Alb  2.0<L>  /  TBili  0.6  /  DBili  x   /  AST  28  /  ALT  12  /  AlkPhos  52  11-17        CAPILLARY BLOOD GLUCOSE

## 2019-11-18 NOTE — DISCHARGE NOTE PROVIDER - NSDCMRMEDTOKEN_GEN_ALL_CORE_FT
Keflex 500 mg oral capsule: 1 cap(s) orally every 12 hours   Multiple Vitamins oral tablet: 1 tab(s) orally once a day  Percocet 5/325 oral tablet: 1 tab(s) orally every 6 hours, As Needed MDD:4 tabs

## 2019-11-18 NOTE — DISCHARGE NOTE PROVIDER - HOSPITAL COURSE
43yo female with no significant PMH, PSH of  and D&C, presented to surgery for breast surgery. Patient underwent right sided simple mastectomy, left modified mastectomy with sentinel lymph node biopsy and breast reconstruction with ROD (abdominal based flap) with abdominal mesh placement. She had an estimated blood loss of 200cc, was given 6l fluids intra-op with urine output of 6l. Pt admitted to the ICU for Post op/ frequent vioptix monitoring.     ICU: Patient's post op labs were significant for lactate of 3.3, was given LR bolus and maintained on iv fluids. Her h/h was noted to trend down to 7.9 likely from periop blood loss, was given 1u prbc overnight with good response. She was also noted to have drop in saturation on vioptix, responded well to 2l oxygen.  Patient stable for downgrade to surgical floor, vioptix  discontinued as per Dr Curtis's recommendation.    patient clinically did well, flap viable. Patient for d/c home with [facility] and homecare

## 2019-11-18 NOTE — DISCHARGE NOTE PROVIDER - CARE PROVIDER_API CALL
Jian Curtis (MD)  ColonRectal Surgery; Plastic Surgery; Surgery; Surgery of the Hand  [address] [facility], [facility] [address]  [place] [address]  Phone: [phone]  Fax: [phone]  Follow Up Time: 1 week

## 2019-11-18 NOTE — PROGRESS NOTE ADULT - SUBJECTIVE AND OBJECTIVE BOX
Pt resting comfortably. No acute complaints  Tolerating pain with meds.  Denies N/V  ambulating     Vital Signs Last 24 Hrs  T(C): 37.1 (18 Nov 2019 05:22), Max: 37.1 (18 Nov 2019 05:22)  T(F): 98.7 (18 Nov 2019 05:22), Max: 98.7 (18 Nov 2019 05:22)  HR: 91 (18 Nov 2019 05:22) (91 - 111)  BP: 109/71 (18 Nov 2019 05:22) (100/66 - 119/75)  BP(mean): 85 (17 Nov 2019 11:00) (73 - 85)  RR: 16 (18 Nov 2019 05:22) (16 - 22)  SpO2: 100% (18 Nov 2019 05:22) (97% - 100%)      Physical:  Gen: A&O x3. NAD  Abd: Soft ND, Dressing C/D/I. John drains in place.  Breast: . Flaps soft, nontender, without skin discoloration b/l, no congestion  Rest of breast soft, nontender, All drains serosanguinous  Abd - incision cdi, martine serosang Pt resting comfortably. No acute complaints  Tolerating pain with meds.  Denies N/V  ambulating     Vital Signs Last 24 Hrs  T(C): 37.1 (18 Nov 2019 05:22), Max: 37.1 (18 Nov 2019 05:22)  T(F): 98.7 (18 Nov 2019 05:22), Max: 98.7 (18 Nov 2019 05:22)  HR: 91 (18 Nov 2019 05:22) (91 - 111)  BP: 109/71 (18 Nov 2019 05:22) (100/66 - 119/75)  BP(mean): 85 (17 Nov 2019 11:00) (73 - 85)  RR: 16 (18 Nov 2019 05:22) (16 - 22)  SpO2: 100% (18 Nov 2019 05:22) (97% - 100%)    I&O's Detail    17 Nov 2019 07:01  -  18 Nov 2019 07:00  --------------------------------------------------------  IN:    Oral Fluid: 490 mL    Solution: 250 mL    Solution: 100 mL  Total IN: 840 mL    OUT:    Bulb: 55 mL    Bulb: 70 mL    Bulb: 17 mL    Bulb: 40 mL    Bulb: 30 mL    Bulb: 37 mL    Indwelling Catheter - Urethral: 500 mL  Total OUT: 749 mL    Total NET: 91 mL            Physical:  Gen: A&O x3. NAD  Abd: Soft ND, Dressing C/D/I. John drains in place.  Breast: . Flaps soft, nontender, without skin discoloration b/l, no congestion  Rest of breast soft, nontender, All drains serosanguinous  Abd - incision cdi, martine serosang                          8.8    12.79 )-----------( 271      ( 18 Nov 2019 07:09 )             28.0   11-18    141  |  108  |  6<L>  ----------------------------<  91  3.6   |  28  |  0.41<L>    Ca    8.5      18 Nov 2019 07:09  Phos  2.0     11-18  Mg     2.0     11-18    TPro  5.5<L>  /  Alb  2.0<L>  /  TBili  0.6  /  DBili  x   /  AST  28  /  ALT  12  /  AlkPhos  52  11-17

## 2019-11-18 NOTE — PROGRESS NOTE ADULT - SUBJECTIVE AND OBJECTIVE BOX
INTERVAL HPI/OVERNIGHT EVENTS:  Pt stable.   Tolerating diet.   flatus/BM.    Vital Signs Last 24 Hrs  T(C): 37.1 (18 Nov 2019 05:22), Max: 37.1 (18 Nov 2019 05:22)  T(F): 98.7 (18 Nov 2019 05:22), Max: 98.7 (18 Nov 2019 05:22)  HR: 91 (18 Nov 2019 05:22) (91 - 106)  BP: 109/71 (18 Nov 2019 05:22) (109/71 - 119/75)  BP(mean): 85 (17 Nov 2019 11:00) (85 - 85)  RR: 16 (18 Nov 2019 05:22) (16 - 19)  SpO2: 100% (18 Nov 2019 05:22) (98% - 100%)    Physical:  Flaps viable  Abdomen: Soft nondistended, nontender.    I&O's Summary    17 Nov 2019 07:01  -  18 Nov 2019 07:00  --------------------------------------------------------  IN: 840 mL / OUT: 749 mL / NET: 91 mL        LABS:                        8.8    12.79 )-----------( 271      ( 18 Nov 2019 07:09 )             28.0             11-18    141  |  108  |  6<L>  ----------------------------<  91  3.6   |  28  |  0.41<L>    Ca    8.5      18 Nov 2019 07:09  Phos  2.0     11-18  Mg     2.0     11-18    TPro  5.5<L>  /  Alb  2.0<L>  /  TBili  0.6  /  DBili  x   /  AST  28  /  ALT  12  /  AlkPhos  52  11-17

## 2019-11-18 NOTE — PROGRESS NOTE ADULT - ASSESSMENT
Plan - POD#3 s/p bilateral breast reconstruction with ROD flaps      - Percocet PRN  - OOB ambulate  - DVT prophylaxis  - Abd binder and bra at bed side  - D/c planning with FRANCIS education

## 2019-11-18 NOTE — DISCHARGE NOTE PROVIDER - NSDCCPCAREPLAN_GEN_ALL_CORE_FT
PRINCIPAL DISCHARGE DIAGNOSIS  Diagnosis: Malignant neoplasm of unspecified site of unspecified female breast  Assessment and Plan of Treatment: Please follow up with Dr. Curtis on scheduled appoinment   Diet as tolerated   No heavy lifting or straining   You will be discharged with FRANCIS drains. You will need to empty them and record outputs accurately. This will be taught to you by the nursing staff. Please do not remove the FRANCIS drains. They will be removed in the office. Please bring to the office accurate records of output.

## 2019-11-19 PROBLEM — C50.919 MALIGNANT NEOPLASM OF UNSPECIFIED SITE OF UNSPECIFIED FEMALE BREAST: Chronic | Status: ACTIVE | Noted: 2019-11-08

## 2019-11-19 LAB — SURGICAL PATHOLOGY STUDY: SIGNIFICANT CHANGE UP

## 2019-11-21 ENCOUNTER — APPOINTMENT (OUTPATIENT)
Dept: PLASTIC SURGERY | Facility: CLINIC | Age: 44
End: 2019-11-21
Payer: COMMERCIAL

## 2019-11-21 PROCEDURE — 99024 POSTOP FOLLOW-UP VISIT: CPT

## 2019-11-27 ENCOUNTER — APPOINTMENT (OUTPATIENT)
Dept: PLASTIC SURGERY | Facility: CLINIC | Age: 44
End: 2019-11-27
Payer: COMMERCIAL

## 2019-11-27 PROCEDURE — 99024 POSTOP FOLLOW-UP VISIT: CPT

## 2019-12-12 ENCOUNTER — APPOINTMENT (OUTPATIENT)
Dept: PLASTIC SURGERY | Facility: CLINIC | Age: 44
End: 2019-12-12
Payer: COMMERCIAL

## 2019-12-12 PROCEDURE — 99024 POSTOP FOLLOW-UP VISIT: CPT

## 2019-12-12 PROCEDURE — XXXXX: CPT

## 2019-12-23 ENCOUNTER — APPOINTMENT (OUTPATIENT)
Dept: SURGERY | Facility: CLINIC | Age: 44
End: 2019-12-23
Payer: COMMERCIAL

## 2019-12-23 PROCEDURE — 99024 POSTOP FOLLOW-UP VISIT: CPT

## 2019-12-23 NOTE — ASSESSMENT
[FreeTextEntry1] : Ms. STANTON is doing well, with excellent post-operative recovery. All surgical incisions are healing well and as expected. There is no evidence of infection or complication, and she is progressing as expected. Post-operative wound care, activity, restrictions and precautions reinforced.  patient was advised to see Dr Mark Cool (oncology) for a consult.   Patient's questions and concerns addressed to  patient's satisfaction.\par

## 2019-12-23 NOTE — PHYSICAL EXAM
[Calm] : calm [de-identified] : She  is alert, well-groomed, and cheerful.\par   [de-identified] : Surgical wounds are  healing well.   no signs of  inflammation or infection.

## 2019-12-23 NOTE — PLAN
[FreeTextEntry1] : dr Mark Cool consult\par patient will follow up in 3 months or if needed. Warning signs, follow up, and restrictions were discussed with the patient. \par follow up with plastics

## 2019-12-23 NOTE — DATA REVIEWED
[FreeTextEntry1] : KEVIN STANTON                    7\par \par \par \par Addendum Report\par \par \par \par \par Addendum\par 1 and 2. Left axillary sentinel lymph nodes, immunohistochemical\par analysis:\par - Isolated epithelial cells (micrometastases, not exceeding 200\par cells and not larger than 2 mm) are identified in 1 of the 2\par lymph nodes by cytokeratin antibodies (AE1,3)\par - The tumor stage is [id number]\par \par Verified by: Oralia Bojorquez M.D.\par (Electronic Signature)\par Reported on: 11/20/19 14:13 EST, [facility],\par [address], [place] [address]\par Phone: [phone]   Fax: [phone]\par _________________________________________________________________\par \par \par Surgical Final Report\par \par \par \par \par Final Diagnosis\par 1 and 2. Left axillary lymph nodes, # 1 and # 2; excision:\par Lymph nodes (2) without identifiable metastatic tumor with\par routine stains; immunohistochemical analysis for isolated tumor\par cells pending\par \par 3. Right breast; mastectomy:\par - Status post needle core biopsy at 9 o' clock position for\par ductal carcinoma in situ, of intermediate to high nuclear grade,\par solid, cribriform and papillary type with necrosis and\par calcification\par - No residual neoplasm is identified\par - Previous biopsy site represented by organizing steatonecrosis\par and hemorrhage\par - Sclerosing and nodular adenosis with fibroadenomatoid changes\par \par 4. Left breast; mastectomy:\par - Invasive ductal carcinoma (21 mm, pT2), poorly differentiated,\par [place] grade 3/3, score 8/9 with minor intratumoral in situ\par component of high nuclear grade, solid type with necrosis\par -margins of resection, nipple and overlying skin are\par uninvolved\par -lympho-vascular permeation is not identified\par - Sclerosing and nodular adenosis with fibroadenomatoid changes\par - Axillary lymph nodes (2) without metastatic carcinoma\par \par 5. Left internal mammary lymph nodes; excision:\par \par \par \par \par \par KEVIN STANTON                    7\par \par \par \par Surgical Final Report\par \par \par \par \par Benign lymph nodes (two)\par \par 6. Right internal mammary lymph nodes; excision:\par Benign lymph nodes (four)\par \par See Synoptic report\par \par Verified by: Oralia Bojorquez M.D.\par (Electronic Signature)\par Reported on: 11/19/19 14:34 EST, [facility],\par [address] [facility], [place] [address]\par Phone: [phone]   Fax: [phone]

## 2019-12-23 NOTE — HISTORY OF PRESENT ILLNESS
[de-identified] : Ms. STANTON  is s/p  BL breast mastectomy with ROD reconstruction on 11/15/2019. Today Ms. STANTON offers no complaints. patient reports no fever, chills,  or  pain. Her  surgical wounds are  healing well. No signs of inflammation, infection or exudate.  patient is being followed by plastics Dr Curtis

## 2020-01-15 ENCOUNTER — APPOINTMENT (OUTPATIENT)
Dept: PLASTIC SURGERY | Facility: CLINIC | Age: 45
End: 2020-01-15
Payer: COMMERCIAL

## 2020-01-15 VITALS
HEART RATE: 120 BPM | OXYGEN SATURATION: 100 % | RESPIRATION RATE: 16 BRPM | SYSTOLIC BLOOD PRESSURE: 138 MMHG | BODY MASS INDEX: 21.6 KG/M2 | WEIGHT: 110 LBS | DIASTOLIC BLOOD PRESSURE: 92 MMHG | HEIGHT: 60 IN

## 2020-01-15 PROCEDURE — 99024 POSTOP FOLLOW-UP VISIT: CPT

## 2020-01-15 NOTE — PHYSICAL EXAM
[de-identified] : s/p mastectomy with ROD reconstruction\par Incisions are healing well, no erythema, no gaps, no drainage noted. No sign of active infection.\par Flaps are well incorporated.\par Good overall symmetry [de-identified] : Incision well healed, no open areas\par + dog ears

## 2020-01-15 NOTE — HISTORY OF PRESENT ILLNESS
[FreeTextEntry1] : This is a 44 year old female who presents for a post op visit s/p bilateral breast reconstruction with ROD flaps DOS: 11/15/19. \par Doing well, no complaints\par Scheduled to see Oncology this week.\par Continues to wear binder & bra.\par Otherwise no other complaints or concerns; denies fever, sweats, or chills.

## 2020-01-15 NOTE — ASSESSMENT
[FreeTextEntry1] : 44 year old female who presents for a post op visit s/p bilateral breast reconstruction with ROD flaps DOS: 11/15/19.  Pt doing well following surgery\par \par - The patient was encouraged to massage the incision site 2-3 times per day for 8-10 minutes with lotion, vitamin E, or cocoa butter to encourage blood flow and to decrease scar tissue formation.\par - Continue binder\par - May return to work\par - Oncology evaluation\par - Follow up in 4-6 weeks. \par

## 2020-06-01 DIAGNOSIS — Z01.818 ENCOUNTER FOR OTHER PREPROCEDURAL EXAMINATION: ICD-10-CM

## 2020-06-02 ENCOUNTER — APPOINTMENT (OUTPATIENT)
Dept: DISASTER EMERGENCY | Facility: CLINIC | Age: 45
End: 2020-06-02

## 2020-12-14 ENCOUNTER — APPOINTMENT (OUTPATIENT)
Dept: SURGERY | Facility: CLINIC | Age: 45
End: 2020-12-14
Payer: COMMERCIAL

## 2020-12-14 VITALS
OXYGEN SATURATION: 99 % | SYSTOLIC BLOOD PRESSURE: 121 MMHG | WEIGHT: 110 LBS | HEIGHT: 60 IN | HEART RATE: 99 BPM | BODY MASS INDEX: 21.6 KG/M2 | DIASTOLIC BLOOD PRESSURE: 80 MMHG

## 2020-12-14 VITALS — TEMPERATURE: 97.2 F

## 2020-12-14 PROCEDURE — 99213 OFFICE O/P EST LOW 20 MIN: CPT

## 2020-12-14 PROCEDURE — 99072 ADDL SUPL MATRL&STAF TM PHE: CPT

## 2020-12-14 NOTE — HISTORY OF PRESENT ILLNESS
[de-identified] : Ms. STANTON  is s/p  BL breast mastectomy with ROD reconstruction on 11/15/2019.  patient is being followed by plastics Dr Curtis. she completed her chemotherapy with Dr. Mark Cool.  She tolerated well. she is on Tamoxifen. Today Ms. STANTON offers no complaints.  All  Her  surgical wounds healed  well.

## 2020-12-14 NOTE — PHYSICAL EXAM
[Alert] : alert [Oriented to Person] : oriented to person [Oriented to Place] : oriented to place [Oriented to Time] : oriented to time [Calm] : calm [de-identified] : She  is alert, well-groomed, and cheerful.\par   [de-identified] : anicteric.  Nasal mucosa pink, septum midline. Oral mucosa pink.  Tongue midline, Pharynx without exudates.\par   [de-identified] : Neck supple. Trachea midline. Thyroid isthmus barely palpable, lobes not felt.\par   [de-identified] : BL breast  reconstruction.

## 2020-12-14 NOTE — ASSESSMENT
[FreeTextEntry1] : Ms. STANTON is doing well, with excellent post-operative recovery. All surgical incisions  healed well and as expected. There is no evidence of infection or complication, and she is progressing as expected.  she was advised to follow up with Dr Mark Cool  and return for exam in 6 months. Patient's questions and concerns addressed to patient's satisfaction.\par

## 2020-12-17 ENCOUNTER — LABORATORY RESULT (OUTPATIENT)
Age: 45
End: 2020-12-17

## 2020-12-17 ENCOUNTER — APPOINTMENT (OUTPATIENT)
Dept: DERMATOLOGY | Facility: CLINIC | Age: 45
End: 2020-12-17
Payer: COMMERCIAL

## 2020-12-17 ENCOUNTER — APPOINTMENT (OUTPATIENT)
Dept: PLASTIC SURGERY | Facility: CLINIC | Age: 45
End: 2020-12-17
Payer: COMMERCIAL

## 2020-12-17 VITALS
TEMPERATURE: 97.7 F | SYSTOLIC BLOOD PRESSURE: 121 MMHG | DIASTOLIC BLOOD PRESSURE: 80 MMHG | WEIGHT: 110 LBS | HEART RATE: 107 BPM | OXYGEN SATURATION: 99 % | HEIGHT: 60 IN | BODY MASS INDEX: 21.6 KG/M2

## 2020-12-17 DIAGNOSIS — L30.9 DERMATITIS, UNSPECIFIED: ICD-10-CM

## 2020-12-17 PROCEDURE — 99072 ADDL SUPL MATRL&STAF TM PHE: CPT

## 2020-12-17 PROCEDURE — 99212 OFFICE O/P EST SF 10 MIN: CPT

## 2020-12-17 PROCEDURE — 99203 OFFICE O/P NEW LOW 30 MIN: CPT

## 2020-12-17 RX ORDER — MOMETASONE FUROATE 1 MG/G
0.1 OINTMENT TOPICAL
Qty: 1 | Refills: 0 | Status: ACTIVE | COMMUNITY
Start: 2020-12-17 | End: 1900-01-01

## 2020-12-17 RX ORDER — DOXYCYCLINE 100 MG/1
100 TABLET, FILM COATED ORAL
Qty: 14 | Refills: 0 | Status: ACTIVE | COMMUNITY
Start: 2020-12-17 | End: 1900-01-01

## 2020-12-21 ENCOUNTER — NON-APPOINTMENT (OUTPATIENT)
Age: 45
End: 2020-12-21

## 2020-12-30 PROBLEM — L30.9 DERMATITIS: Status: ACTIVE | Noted: 2020-12-17

## 2021-06-08 PROBLEM — C50.919 BREAST CANCER: Status: ACTIVE | Noted: 2019-09-09

## 2021-06-14 ENCOUNTER — APPOINTMENT (OUTPATIENT)
Dept: SURGERY | Facility: CLINIC | Age: 46
End: 2021-06-14

## 2021-06-14 DIAGNOSIS — C50.919 MALIGNANT NEOPLASM OF UNSPECIFIED SITE OF UNSPECIFIED FEMALE BREAST: ICD-10-CM

## 2021-07-19 ENCOUNTER — APPOINTMENT (OUTPATIENT)
Dept: SURGERY | Facility: CLINIC | Age: 46
End: 2021-07-19
Payer: COMMERCIAL

## 2021-07-19 VITALS
TEMPERATURE: 98.6 F | DIASTOLIC BLOOD PRESSURE: 76 MMHG | SYSTOLIC BLOOD PRESSURE: 117 MMHG | WEIGHT: 112 LBS | OXYGEN SATURATION: 99 % | BODY MASS INDEX: 21.99 KG/M2 | HEART RATE: 94 BPM | HEIGHT: 60 IN

## 2021-07-19 PROCEDURE — 99213 OFFICE O/P EST LOW 20 MIN: CPT

## 2021-07-19 NOTE — PHYSICAL EXAM
[Alert] : alert [Oriented to Person] : oriented to person [Oriented to Place] : oriented to place [Oriented to Time] : oriented to time [Calm] : calm [de-identified] : She  is alert, well-groomed, and cheerful.\par   [de-identified] : anicteric.  Nasal mucosa pink, septum midline. Oral mucosa pink.  Tongue midline, Pharynx without exudates.\par   [de-identified] : Neck supple. Trachea midline. Thyroid isthmus barely palpable, lobes not felt.\par   [de-identified] : BL breast  reconstruction. small nodularity in the right breast underneath of the FRANCIS drain site

## 2021-07-19 NOTE — PLAN
[FreeTextEntry1] : Ms. STANTON  is presenting  today for an evaluation .  she is doing well and offers no complaints.  Results of  her  physical examination findings were discussed in details.   she has no recurrence and doing very well.  She  was advised to follow up  in   6 months for exam . Importance of monthly self-breast examination was reinforced.  Patient's questions and concerns addressed to patient's satisfaction.\par \par

## 2021-07-19 NOTE — HISTORY OF PRESENT ILLNESS
[de-identified] : Ms. STANTON  is s/p  BL breast mastectomy with ROD reconstruction on 11/15/2019.  patient is being followed by plastics Dr Curtis .     Ms. STANTON denies any trauma Patient denies any fever, night sweats or loss of appetite.     Patient is on no hormonal replacement therapy.  she is on Tamoxifen  [de-identified] : Patient reports no interval changes to her overall health status or medical history \par

## 2021-07-29 ENCOUNTER — APPOINTMENT (OUTPATIENT)
Dept: PLASTIC SURGERY | Facility: CLINIC | Age: 46
End: 2021-07-29

## 2021-09-02 ENCOUNTER — APPOINTMENT (OUTPATIENT)
Dept: PLASTIC SURGERY | Facility: CLINIC | Age: 46
End: 2021-09-02

## 2021-12-09 ENCOUNTER — APPOINTMENT (OUTPATIENT)
Dept: PLASTIC SURGERY | Facility: CLINIC | Age: 46
End: 2021-12-09
Payer: COMMERCIAL

## 2021-12-09 PROCEDURE — 99213 OFFICE O/P EST LOW 20 MIN: CPT

## 2022-01-21 ENCOUNTER — OUTPATIENT (OUTPATIENT)
Dept: OUTPATIENT SERVICES | Facility: HOSPITAL | Age: 47
LOS: 1 days | End: 2022-01-21
Payer: COMMERCIAL

## 2022-01-21 VITALS
RESPIRATION RATE: 16 BRPM | OXYGEN SATURATION: 97 % | TEMPERATURE: 98 F | HEART RATE: 95 BPM | HEIGHT: 60 IN | DIASTOLIC BLOOD PRESSURE: 79 MMHG | SYSTOLIC BLOOD PRESSURE: 117 MMHG | WEIGHT: 111.99 LBS

## 2022-01-21 DIAGNOSIS — Z98.890 OTHER SPECIFIED POSTPROCEDURAL STATES: ICD-10-CM

## 2022-01-21 DIAGNOSIS — C50.919 MALIGNANT NEOPLASM OF UNSPECIFIED SITE OF UNSPECIFIED FEMALE BREAST: ICD-10-CM

## 2022-01-21 DIAGNOSIS — Z85.3 PERSONAL HISTORY OF MALIGNANT NEOPLASM OF BREAST: ICD-10-CM

## 2022-01-21 DIAGNOSIS — Z98.890 OTHER SPECIFIED POSTPROCEDURAL STATES: Chronic | ICD-10-CM

## 2022-01-21 DIAGNOSIS — Z98.891 HISTORY OF UTERINE SCAR FROM PREVIOUS SURGERY: Chronic | ICD-10-CM

## 2022-01-21 DIAGNOSIS — Z01.818 ENCOUNTER FOR OTHER PREPROCEDURAL EXAMINATION: ICD-10-CM

## 2022-01-21 LAB
ANION GAP SERPL CALC-SCNC: 13 MMOL/L — SIGNIFICANT CHANGE UP (ref 5–17)
BUN SERPL-MCNC: 9 MG/DL — SIGNIFICANT CHANGE UP (ref 7–23)
CALCIUM SERPL-MCNC: 9.9 MG/DL — SIGNIFICANT CHANGE UP (ref 8.4–10.5)
CHLORIDE SERPL-SCNC: 100 MMOL/L — SIGNIFICANT CHANGE UP (ref 96–108)
CO2 SERPL-SCNC: 25 MMOL/L — SIGNIFICANT CHANGE UP (ref 22–31)
CREAT SERPL-MCNC: 0.53 MG/DL — SIGNIFICANT CHANGE UP (ref 0.5–1.3)
GLUCOSE SERPL-MCNC: 128 MG/DL — HIGH (ref 70–99)
HCT VFR BLD CALC: 40.1 % — SIGNIFICANT CHANGE UP (ref 34.5–45)
HGB BLD-MCNC: 12.3 G/DL — SIGNIFICANT CHANGE UP (ref 11.5–15.5)
MCHC RBC-ENTMCNC: 20.1 PG — LOW (ref 27–34)
MCHC RBC-ENTMCNC: 30.7 GM/DL — LOW (ref 32–36)
MCV RBC AUTO: 65.6 FL — LOW (ref 80–100)
NRBC # BLD: 0 /100 WBCS — SIGNIFICANT CHANGE UP (ref 0–0)
PLATELET # BLD AUTO: 350 K/UL — SIGNIFICANT CHANGE UP (ref 150–400)
POTASSIUM SERPL-MCNC: 3.9 MMOL/L — SIGNIFICANT CHANGE UP (ref 3.5–5.3)
POTASSIUM SERPL-SCNC: 3.9 MMOL/L — SIGNIFICANT CHANGE UP (ref 3.5–5.3)
RBC # BLD: 6.11 M/UL — HIGH (ref 3.8–5.2)
RBC # FLD: 14.6 % — HIGH (ref 10.3–14.5)
SODIUM SERPL-SCNC: 138 MMOL/L — SIGNIFICANT CHANGE UP (ref 135–145)
WBC # BLD: 7.28 K/UL — SIGNIFICANT CHANGE UP (ref 3.8–10.5)
WBC # FLD AUTO: 7.28 K/UL — SIGNIFICANT CHANGE UP (ref 3.8–10.5)

## 2022-01-21 PROCEDURE — 80048 BASIC METABOLIC PNL TOTAL CA: CPT

## 2022-01-21 PROCEDURE — 85027 COMPLETE CBC AUTOMATED: CPT

## 2022-01-21 PROCEDURE — G0463: CPT

## 2022-01-21 RX ORDER — SODIUM CHLORIDE 9 MG/ML
3 INJECTION INTRAMUSCULAR; INTRAVENOUS; SUBCUTANEOUS EVERY 8 HOURS
Refills: 0 | Status: DISCONTINUED | OUTPATIENT
Start: 2022-02-11 | End: 2022-02-25

## 2022-01-21 NOTE — H&P PST ADULT - FALL HARM RISK - UNIVERSAL INTERVENTIONS
Bed in lowest position, wheels locked, appropriate side rails in place/Call bell, personal items and telephone in reach/Instruct patient to call for assistance before getting out of bed or chair/Non-slip footwear when patient is out of bed/[place] to call system/Physically safe environment - no spills, clutter or unnecessary equipment/Purposeful Proactive Rounding/Room/bathroom lighting operational, light cord in reach

## 2022-01-21 NOTE — H&P PST ADULT - NSICDXPASTSURGICALHX_GEN_ALL_CORE_FT
PAST SURGICAL HISTORY:  H/O bilateral mastectomy and ROD flap reconstruction (2019)    H/O  section x 1    S/P bilateral breast biopsy     S/P dilation and curettage

## 2022-01-21 NOTE — H&P PST ADULT - NSICDXPASTMEDICALHX_GEN_ALL_CORE_FT
PAST MEDICAL HISTORY:  Malignant neoplasm of unspecified site of unspecified female breast Dx'd 2019, s/p B/L mastectomy and adjuvant chemtherapy (completed June 2020)     PAST MEDICAL HISTORY:  History of chemotherapy Jan 2020- June 2020    Malignant neoplasm of unspecified site of unspecified female breast Dx'd 2019, s/p B/L mastectomy and adjuvant chemtherapy (completed June 2020)

## 2022-01-21 NOTE — H&P PST ADULT - NSICDXFAMILYHX_GEN_ALL_CORE_FT
FAMILY HISTORY:  FH: breast cancer, Aunt and 1st cousin  FH: heart disease, In  father  FHx: kidney disease, and Heart disease in  mother    Sibling  Still living? Unknown  FH: diabetes mellitus, Age at diagnosis: Age Unknown

## 2022-01-21 NOTE — H&P PST ADULT - NSANTHTOTALSCORECAL_ENT_A_CORE
1 O-T Advancement Flap Text: The defect edges were debeveled with a #15 scalpel blade.  Given the location of the defect, shape of the defect and the proximity to free margins an O-T advancement flap was deemed most appropriate.  Using a sterile surgical marker, an appropriate advancement flap was drawn incorporating the defect and placing the expected incisions within the relaxed skin tension lines where possible.    The area thus outlined was incised deep to adipose tissue with a #15 scalpel blade.  The skin margins were undermined to an appropriate distance in all directions utilizing iris scissors.

## 2022-01-21 NOTE — H&P PST ADULT - PROBLEM SELECTOR PLAN 1
Pt. is scheduled for revision of B/L ROD flap reconstructed breasts with scar revision, revision of abdominal scars, liposuction and fat grafting on 2/11/21.  Preop instructions reviewed, pt verbalized understanding.  Preop labs drawn (CBC, BMP).  Preop Covid swab scheduled for 2/8/21 at [facility] testing site.

## 2022-01-21 NOTE — H&P PST ADULT - HISTORY OF PRESENT ILLNESS
dx'd with breast cancer Diagnosed 11/2019 adjuvant chemo (Jan to June 2020),     45 yo female with no significant PMHx reports the above. Patient denies pain, discharge in her breast.  She is scheduled for Left Modified Mastectomy and Right Simple Mastectomy with [place] Lymph node Biopsy  Bilateral Breast Reconstruction with Dep Flap Possible Mesh, on 11/15/19 47 y/o female with history of breast cancer presents today for presurgical evaluation.  She was diagnosed with breast cancer and had B/L mastectomy with ROD flap reconstruction 11/2019 and was treated with adjuvant chemo (Jan 2020, completed in June 2020).  She is scheduled for revision of bilateral ROD flap reconstructed breasts with scar revision, revision of abdominal scars, liposuction and fat grafting on 2/11/21.    She denies any recent infection, fever, chills, chest pain, shortness of breath, cough and wheezing.    Preop Covid swab scheduled for 2/8/21 at [facility].

## 2022-01-24 ENCOUNTER — APPOINTMENT (OUTPATIENT)
Dept: SURGERY | Facility: CLINIC | Age: 47
End: 2022-01-24

## 2022-01-31 PROBLEM — Z92.21 PERSONAL HISTORY OF ANTINEOPLASTIC CHEMOTHERAPY: Chronic | Status: ACTIVE | Noted: 2022-01-21

## 2022-02-08 ENCOUNTER — OUTPATIENT (OUTPATIENT)
Dept: OUTPATIENT SERVICES | Facility: HOSPITAL | Age: 47
LOS: 1 days | End: 2022-02-08
Payer: COMMERCIAL

## 2022-02-08 DIAGNOSIS — Z11.52 ENCOUNTER FOR SCREENING FOR COVID-19: ICD-10-CM

## 2022-02-08 DIAGNOSIS — Z98.890 OTHER SPECIFIED POSTPROCEDURAL STATES: Chronic | ICD-10-CM

## 2022-02-08 DIAGNOSIS — Z98.891 HISTORY OF UTERINE SCAR FROM PREVIOUS SURGERY: Chronic | ICD-10-CM

## 2022-02-08 LAB — SARS-COV-2 RNA SPEC QL NAA+PROBE: SIGNIFICANT CHANGE UP

## 2022-02-08 PROCEDURE — U0005: CPT

## 2022-02-08 PROCEDURE — U0003: CPT

## 2022-02-08 PROCEDURE — C9803: CPT

## 2022-02-10 ENCOUNTER — TRANSCRIPTION ENCOUNTER (OUTPATIENT)
Age: 47
End: 2022-02-10

## 2022-02-10 RX ORDER — SODIUM CHLORIDE 9 MG/ML
1000 INJECTION, SOLUTION INTRAVENOUS
Refills: 0 | Status: DISCONTINUED | OUTPATIENT
Start: 2022-02-11 | End: 2022-02-25

## 2022-02-11 ENCOUNTER — APPOINTMENT (OUTPATIENT)
Dept: PLASTIC SURGERY | Facility: HOSPITAL | Age: 47
End: 2022-02-11

## 2022-02-11 ENCOUNTER — OUTPATIENT (OUTPATIENT)
Dept: OUTPATIENT SERVICES | Facility: HOSPITAL | Age: 47
LOS: 1 days | End: 2022-02-11
Payer: COMMERCIAL

## 2022-02-11 VITALS
DIASTOLIC BLOOD PRESSURE: 77 MMHG | HEIGHT: 60 IN | RESPIRATION RATE: 16 BRPM | HEART RATE: 110 BPM | TEMPERATURE: 98 F | WEIGHT: 111.99 LBS | OXYGEN SATURATION: 99 % | SYSTOLIC BLOOD PRESSURE: 111 MMHG

## 2022-02-11 VITALS
TEMPERATURE: 98 F | DIASTOLIC BLOOD PRESSURE: 64 MMHG | RESPIRATION RATE: 16 BRPM | OXYGEN SATURATION: 100 % | SYSTOLIC BLOOD PRESSURE: 110 MMHG | HEART RATE: 102 BPM

## 2022-02-11 DIAGNOSIS — Z85.3 PERSONAL HISTORY OF MALIGNANT NEOPLASM OF BREAST: ICD-10-CM

## 2022-02-11 DIAGNOSIS — Z98.890 OTHER SPECIFIED POSTPROCEDURAL STATES: Chronic | ICD-10-CM

## 2022-02-11 DIAGNOSIS — Z98.891 HISTORY OF UTERINE SCAR FROM PREVIOUS SURGERY: Chronic | ICD-10-CM

## 2022-02-11 DIAGNOSIS — Z01.818 ENCOUNTER FOR OTHER PREPROCEDURAL EXAMINATION: ICD-10-CM

## 2022-02-11 DIAGNOSIS — Z98.890 OTHER SPECIFIED POSTPROCEDURAL STATES: ICD-10-CM

## 2022-02-11 PROCEDURE — 15877 SUCTION LIPECTOMY TRUNK: CPT

## 2022-02-11 PROCEDURE — 15772 GRFG AUTOL FAT LIPO EA ADDL: CPT

## 2022-02-11 PROCEDURE — 14301 TIS TRNFR ANY 30.1-60 SQ CM: CPT

## 2022-02-11 PROCEDURE — 15771 GRFG AUTOL FAT LIPO 50 CC/<: CPT

## 2022-02-11 PROCEDURE — 19350 NIPPLE/AREOLA RECONSTRUCTION: CPT | Mod: 50

## 2022-02-11 PROCEDURE — 19380 REVJ RECONSTRUCTED BREAST: CPT | Mod: 50

## 2022-02-11 RX ORDER — TRAMADOL HYDROCHLORIDE 50 MG/1
1 TABLET ORAL
Qty: 15 | Refills: 0
Start: 2022-02-11 | End: 2022-02-15

## 2022-02-11 RX ORDER — OXYCODONE HYDROCHLORIDE 5 MG/1
1 TABLET ORAL
Qty: 12 | Refills: 0
Start: 2022-02-11 | End: 2022-02-13

## 2022-02-11 RX ORDER — TAMOXIFEN CITRATE 20 MG/1
1 TABLET, FILM COATED ORAL
Qty: 0 | Refills: 0 | DISCHARGE

## 2022-02-11 RX ORDER — CEFAZOLIN SODIUM 1 G
2000 VIAL (EA) INJECTION ONCE
Refills: 0 | Status: COMPLETED | OUTPATIENT
Start: 2022-02-11 | End: 2022-02-11

## 2022-02-11 RX ORDER — SODIUM CHLORIDE 9 MG/ML
1000 INJECTION, SOLUTION INTRAVENOUS
Refills: 0 | Status: DISCONTINUED | OUTPATIENT
Start: 2022-02-11 | End: 2022-02-25

## 2022-02-11 RX ORDER — HYDROMORPHONE HYDROCHLORIDE 2 MG/ML
0.25 INJECTION INTRAMUSCULAR; INTRAVENOUS; SUBCUTANEOUS
Refills: 0 | Status: DISCONTINUED | OUTPATIENT
Start: 2022-02-11 | End: 2022-02-11

## 2022-02-11 RX ORDER — OXYCODONE HYDROCHLORIDE 5 MG/1
5 TABLET ORAL ONCE
Refills: 0 | Status: DISCONTINUED | OUTPATIENT
Start: 2022-02-11 | End: 2022-02-11

## 2022-02-11 RX ORDER — CHLORHEXIDINE GLUCONATE 213 G/1000ML
1 SOLUTION TOPICAL ONCE
Refills: 0 | Status: COMPLETED | OUTPATIENT
Start: 2022-02-11 | End: 2022-02-11

## 2022-02-11 RX ORDER — ONDANSETRON 8 MG/1
4 TABLET, FILM COATED ORAL ONCE
Refills: 0 | Status: COMPLETED | OUTPATIENT
Start: 2022-02-11 | End: 2022-02-11

## 2022-02-11 RX ORDER — APREPITANT 80 MG/1
40 CAPSULE ORAL ONCE
Refills: 0 | Status: COMPLETED | OUTPATIENT
Start: 2022-02-11 | End: 2022-02-11

## 2022-02-11 RX ORDER — LIDOCAINE HCL 20 MG/ML
0.2 VIAL (ML) INJECTION ONCE
Refills: 0 | Status: COMPLETED | OUTPATIENT
Start: 2022-02-11 | End: 2022-02-11

## 2022-02-11 RX ADMIN — SODIUM CHLORIDE 100 MILLILITER(S): 9 INJECTION, SOLUTION INTRAVENOUS at 10:22

## 2022-02-11 RX ADMIN — CHLORHEXIDINE GLUCONATE 1 APPLICATION(S): 213 SOLUTION TOPICAL at 10:21

## 2022-02-11 RX ADMIN — APREPITANT 40 MILLIGRAM(S): 80 CAPSULE ORAL at 10:22

## 2022-02-11 RX ADMIN — ONDANSETRON 4 MILLIGRAM(S): 8 TABLET, FILM COATED ORAL at 15:50

## 2022-02-11 NOTE — ASU DISCHARGE PLAN (ADULT/PEDIATRIC) - NS MD DC FALL RISK RISK
For information on Fall & Injury Prevention, visit: [email or web address] OR  [email or web address] OR  https://www.cdc.gov/steadi/patient.html

## 2022-02-11 NOTE — ASU DISCHARGE PLAN (ADULT/PEDIATRIC) - ASU DC SPECIAL INSTRUCTIONSFT
1. Please follow up with Dr. Curtis's PA, Meryl, next week 02/18 FRIDAY 3:30pm for your first post operative visit. Your appointment has already been made. Please call the office if you need to make any changes to your appointment at [phone] (during normal business hours M-F 9-5pm).     2. Activity:   Please avoid any strenuous activities, AVOID bending, stretching, reaching overhead, or any heavy lifting.  Please do not remove the breast dressings until you see Meryl in the office. Those are waterproof, so after 2 days you may take a quick shower. Please wear the bra loosely and avoid any pressure on the breast.  3. Dressings:   Some OOZING and blood on the dressing is normal and to be expected. If it becomes saturated w/ BRIGHT red blood that does not stop, please call [phone] for email MERYL: [email or web address]    4. Medications:  Your medications were sent to your pharmacy. Please take the antibiotics as directed.  Please take the prescribed medications as directed.   For MILD pain please take regular over the counter pain medications such as: Advil, Tylenol, Motrin.   Only take the narcotic prescribed pain medication for SEVERE PAIN.   If constipation occurs after taking narcotic pain medications, please take an over the counter stool softener.

## 2022-02-11 NOTE — BRIEF OPERATIVE NOTE - NSICDXBRIEFPROCEDURE_GEN_ALL_CORE_FT
PROCEDURES:  Nipple reconstruction 11-Feb-2022 15:03:40  Sean Ingram  Fat graft, breast 11-Feb-2022 15:04:02  Sean Ingram  Complex repair of trunk, 2.6cm to 5.0cm 11-Feb-2022 15:04:22  Sean Ingram

## 2022-02-11 NOTE — ASU DISCHARGE PLAN (ADULT/PEDIATRIC) - BATHING
keep incisions dry for 2 days. After 2 days you may take a quick shower. Do not apply soap on breast dressings. The dressings on the breast are waterproof./Sponge only/Do not submerge in water

## 2022-02-11 NOTE — ASU DISCHARGE PLAN (ADULT/PEDIATRIC) - ACTIVITY LEVEL
Please only sleep on your back NOT on your sides or stomach. Avoid any pressure to chest or breast./No excercise/No heavy lifting/No sports/gym/No weight bearing/No tub baths/No intercourse

## 2022-02-11 NOTE — BRIEF OPERATIVE NOTE - OPERATION/FINDINGS
B/l nipple reconstruction with CV flaps, fat grafting to superior/medial breasts from fat taken from lower abdomen and lateral breast/axillary tissue. Excision of standing cutaneous deformities from prior abdominal scar.

## 2022-02-11 NOTE — ASU DISCHARGE PLAN (ADULT/PEDIATRIC) - CALL YOUR DOCTOR IF YOU HAVE ANY OF THE FOLLOWING:
[phone] or call 911./Bleeding that does not stop/Swelling that gets worse/Pain not relieved by Medications/Fever greater than (need to indicate Fahrenheit or Celsius)/Wound/Surgical Site with redness, or foul smelling discharge or pus

## 2022-02-11 NOTE — ASU DISCHARGE PLAN (ADULT/PEDIATRIC) - PROCEDURE
Revision of bilateral reconstructed breasts, reconstruction of bilateral nipples, revision of abdominal scar, liposuction and fat grafting - by Dr. Jian Curtis.

## 2022-02-11 NOTE — ASU PATIENT PROFILE, ADULT - NSICDXPASTMEDICALHX_GEN_ALL_CORE_FT
PAST MEDICAL HISTORY:  History of chemotherapy Jan 2020- June 2020    Malignant neoplasm of unspecified site of unspecified female breast Dx'd 2019, s/p B/L mastectomy and adjuvant chemtherapy (completed June 2020)

## 2022-02-11 NOTE — ASU PATIENT PROFILE, ADULT - FALL HARM RISK - UNIVERSAL INTERVENTIONS
Bed in lowest position, wheels locked, appropriate side rails in place/Call bell, personal items and telephone in reach/Instruct patient to call for assistance before getting out of bed or chair/Non-slip footwear when patient is out of bed/[place] to call system/Physically safe environment - no spills, clutter or unnecessary equipment/Purposeful Proactive Rounding/Room/bathroom lighting operational, light cord in reach

## 2022-02-14 ENCOUNTER — NON-APPOINTMENT (OUTPATIENT)
Age: 47
End: 2022-02-14

## 2022-02-18 ENCOUNTER — APPOINTMENT (OUTPATIENT)
Dept: PLASTIC SURGERY | Facility: CLINIC | Age: 47
End: 2022-02-18
Payer: COMMERCIAL

## 2022-02-18 VITALS
SYSTOLIC BLOOD PRESSURE: 138 MMHG | WEIGHT: 113 LBS | HEIGHT: 60 IN | BODY MASS INDEX: 22.19 KG/M2 | OXYGEN SATURATION: 99 % | HEART RATE: 106 BPM | DIASTOLIC BLOOD PRESSURE: 88 MMHG | TEMPERATURE: 97.7 F

## 2022-02-18 PROCEDURE — 99024 POSTOP FOLLOW-UP VISIT: CPT

## 2022-02-24 ENCOUNTER — APPOINTMENT (OUTPATIENT)
Dept: SURGERY | Facility: CLINIC | Age: 47
End: 2022-02-24
Payer: COMMERCIAL

## 2022-02-24 DIAGNOSIS — C50.919 MALIGNANT NEOPLASM OF UNSPECIFIED SITE OF UNSPECIFIED FEMALE BREAST: ICD-10-CM

## 2022-02-24 PROCEDURE — 99213 OFFICE O/P EST LOW 20 MIN: CPT

## 2022-02-24 NOTE — HISTORY OF PRESENT ILLNESS
[de-identified] : Ms. STANTON  is s/p  BL breast mastectomy with ROD reconstruction on 11/15/2019.  patient is being followed by plastics Dr Curtis. she is being followed by Dr Mark Cool.   she is on Tamoxifen. s/p revision of her bilateral ROD reconstructed breasts with revision of her abdominal scar, fat grafting and bilateral nipple reconstruction DOS: 2/11/22.

## 2022-02-24 NOTE — PLAN
[FreeTextEntry1] : Ms. STANTON  is presenting  today for  breast exam. .  she is doing well and offers no complaints. she has no recurrence. greast cosmetic results after the reconstruction. she will follow up in 1 year or if needed.    Patient's questions and concerns addressed to patient's satisfaction.\par \par  F/u with Dr Mark Cool

## 2022-02-24 NOTE — PHYSICAL EXAM
[Alert] : alert [Oriented to Person] : oriented to person [Oriented to Place] : oriented to place [Oriented to Time] : oriented to time [Calm] : calm [de-identified] : She  is alert, well-groomed, and cheerful.\par   [de-identified] : anicteric.  Nasal mucosa pink, septum midline. Oral mucosa pink.  Tongue midline, Pharynx without exudates.\par   [de-identified] : Neck supple. Trachea midline. Thyroid isthmus barely palpable, lobes not felt.\par   [de-identified] : BL breast  reconstruction with great cosmetic results.

## 2022-03-03 ENCOUNTER — APPOINTMENT (OUTPATIENT)
Dept: PLASTIC SURGERY | Facility: CLINIC | Age: 47
End: 2022-03-03
Payer: COMMERCIAL

## 2022-03-03 VITALS
HEIGHT: 60 IN | BODY MASS INDEX: 22.19 KG/M2 | OXYGEN SATURATION: 98 % | WEIGHT: 113 LBS | DIASTOLIC BLOOD PRESSURE: 76 MMHG | HEART RATE: 112 BPM | TEMPERATURE: 97.7 F | SYSTOLIC BLOOD PRESSURE: 126 MMHG

## 2022-03-03 DIAGNOSIS — N65.0 DEFORMITY OF RECONSTRUCTED BREAST: ICD-10-CM

## 2022-03-03 PROCEDURE — 99024 POSTOP FOLLOW-UP VISIT: CPT

## 2022-03-06 PROBLEM — N65.0 DEFORMITY OF RECONSTRUCTED BREAST: Status: ACTIVE | Noted: 2021-12-20

## 2022-04-19 ENCOUNTER — APPOINTMENT (OUTPATIENT)
Dept: DERMATOLOGY | Facility: CLINIC | Age: 47
End: 2022-04-19

## 2022-06-10 ENCOUNTER — APPOINTMENT (OUTPATIENT)
Dept: PLASTIC SURGERY | Facility: CLINIC | Age: 47
End: 2022-06-10
Payer: COMMERCIAL

## 2022-06-10 VITALS
OXYGEN SATURATION: 99 % | BODY MASS INDEX: 22.19 KG/M2 | SYSTOLIC BLOOD PRESSURE: 121 MMHG | TEMPERATURE: 97.9 F | DIASTOLIC BLOOD PRESSURE: 84 MMHG | HEART RATE: 102 BPM | WEIGHT: 113 LBS | HEIGHT: 60 IN

## 2022-06-10 DIAGNOSIS — Z85.3 PERSONAL HISTORY OF MALIGNANT NEOPLASM OF BREAST: ICD-10-CM

## 2022-06-10 PROCEDURE — 19350 NIPPLE/AREOLA RECONSTRUCTION: CPT | Mod: LT

## 2022-06-10 NOTE — REASON FOR VISIT
[Procedure: _________] : a [unfilled] procedure visit [FreeTextEntry1] : here today for bilateral NAC tattooing.

## 2022-06-10 NOTE — PROCEDURE
[Nl] : None [FreeTextEntry1] : Absence of Nipples, History of Breast Cancer [FreeTextEntry2] : Bilateral Nipple-Areolar Complex Tattooing [FreeTextEntry6] : Bilateral nipple-areolar complex area was marked. Bilateral nipple - areolar area was cleaned and prepped with Hibicleanse. \par Bilateral areola area was tattooed with multiple passes of 2A, 6A with 5 slope needle utilizing [place] Contour Nipple Tattoo machine. Approximately 5cm diameter. \par Bilateral reconstructed nipple were tattooed with multiple passes of 6A, 7 with 5 slope needle utilizing [place] Contour Nipple Tattoo machine. Excellent pigmentation and result after tattooing bilaterally.\par After, bilateral NAC were cleaned, Bacitracin and Telfa was applied.\par Post procedure instructions were given to apply Vaseline and Telfa daily for 1 week.

## 2022-06-30 ENCOUNTER — APPOINTMENT (OUTPATIENT)
Dept: PLASTIC SURGERY | Facility: CLINIC | Age: 47
End: 2022-06-30

## 2022-06-30 VITALS
HEIGHT: 60 IN | RESPIRATION RATE: 16 BRPM | OXYGEN SATURATION: 99 % | BODY MASS INDEX: 21.99 KG/M2 | WEIGHT: 112 LBS | TEMPERATURE: 97.9 F | SYSTOLIC BLOOD PRESSURE: 116 MMHG | DIASTOLIC BLOOD PRESSURE: 78 MMHG | HEART RATE: 104 BPM

## 2022-06-30 DIAGNOSIS — Z98.890 OTHER SPECIFIED POSTPROCEDURAL STATES: ICD-10-CM

## 2022-06-30 DIAGNOSIS — Z90.13 ACQUIRED ABSENCE OF BILATERAL BREASTS AND NIPPLES: ICD-10-CM

## 2022-06-30 PROCEDURE — 99024 POSTOP FOLLOW-UP VISIT: CPT

## 2023-10-19 ENCOUNTER — APPOINTMENT (OUTPATIENT)
Dept: OBGYN | Facility: CLINIC | Age: 48
End: 2023-10-19
Payer: COMMERCIAL

## 2023-10-19 VITALS
DIASTOLIC BLOOD PRESSURE: 81 MMHG | WEIGHT: 120 LBS | HEIGHT: 60 IN | BODY MASS INDEX: 23.56 KG/M2 | SYSTOLIC BLOOD PRESSURE: 122 MMHG

## 2023-10-19 DIAGNOSIS — Z01.419 ENCOUNTER FOR GYNECOLOGICAL EXAMINATION (GENERAL) (ROUTINE) W/OUT ABNORMAL FINDINGS: ICD-10-CM

## 2023-10-19 PROCEDURE — 99386 PREV VISIT NEW AGE 40-64: CPT

## 2023-10-22 LAB — HPV HIGH+LOW RISK DNA PNL CVX: NOT DETECTED

## 2023-10-27 LAB — CYTOLOGY CVX/VAG DOC THIN PREP: NORMAL

## 2024-08-01 NOTE — PACU DISCHARGE NOTE - NAUSEA/VOMITING:
None Show Aperture Variable?: Yes Render Post-Care Instructions In Note?: no Post-Care Instructions: I reviewed with the patient in detail post-care instructions. Patient is to wear sunprotection, and avoid picking at any of the treated lesions. Pt may apply Vaseline to crusted or scabbing areas. Duration Of Freeze Thaw-Cycle (Seconds): 10-15 Detail Level: Detailed Consent: The patient's consent was obtained including but not limited to risks of crusting, scabbing, blistering, scarring, darker or lighter pigmentary change, recurrence, incomplete removal and infection. Medical Necessity Information: It is in your best interest to select a reason for this procedure from the list below. All of these items fulfill various CMS LCD requirements except the new and changing color options. Spray Paint Text: The liquid nitrogen was applied to the skin utilizing a spray paint frosting technique. Number Of Freeze-Thaw Cycles: 1 freeze-thaw cycle Medical Necessity Clause: This procedure was medically necessary because the lesions that were treated were:

## 2024-10-16 NOTE — H&P PST ADULT - DATE OF LAST VACCINATION
Assessment and Plan: labs - cbc,pt/ptt,bmp,t&s,nose cx,ekg  M/C required and infectious disease   preop 3 day hibiclens instruction reviewed and given .instructed on if  nose cx positive use mupuricin 5 days and checklist given  take routine meds DOS with sips of water. avoid NSAID and OTC supplements. verbalized understanding  information on proper nutrition , increase protein and better food choices provided in packet  ensure clear
07-Feb-2021
